# Patient Record
Sex: MALE | Race: WHITE | NOT HISPANIC OR LATINO | Employment: OTHER | URBAN - METROPOLITAN AREA
[De-identification: names, ages, dates, MRNs, and addresses within clinical notes are randomized per-mention and may not be internally consistent; named-entity substitution may affect disease eponyms.]

---

## 2017-12-19 ENCOUNTER — APPOINTMENT (EMERGENCY)
Dept: RADIOLOGY | Facility: HOSPITAL | Age: 82
End: 2017-12-19
Payer: MEDICARE

## 2017-12-19 ENCOUNTER — HOSPITAL ENCOUNTER (EMERGENCY)
Facility: HOSPITAL | Age: 82
Discharge: HOME | End: 2017-12-20
Attending: EMERGENCY MEDICINE | Admitting: EMERGENCY MEDICINE
Payer: MEDICARE

## 2017-12-19 VITALS
WEIGHT: 210.98 LBS | TEMPERATURE: 98.3 F | DIASTOLIC BLOOD PRESSURE: 86 MMHG | HEART RATE: 79 BPM | RESPIRATION RATE: 20 BRPM | OXYGEN SATURATION: 97 % | SYSTOLIC BLOOD PRESSURE: 183 MMHG

## 2017-12-19 DIAGNOSIS — S93.402A LEFT ANKLE SPRAIN: Primary | ICD-10-CM

## 2017-12-19 PROCEDURE — 73610 X-RAY EXAM OF ANKLE: CPT

## 2017-12-19 RX ORDER — IRBESARTAN 150 MG/1
75 TABLET ORAL DAILY
COMMUNITY

## 2017-12-19 RX ORDER — LANOLIN ALCOHOL/MO/W.PET/CERES
1 CREAM (GRAM) TOPICAL DAILY
COMMUNITY

## 2017-12-19 RX ORDER — LANOLIN ALCOHOL/MO/W.PET/CERES
CREAM (GRAM) TOPICAL DAILY
COMMUNITY

## 2017-12-19 RX ORDER — PREDNISOLONE ACETATE 10 MG/ML
1 SUSPENSION/ DROPS OPHTHALMIC 3 TIMES DAILY
COMMUNITY

## 2017-12-19 RX ORDER — MELATONIN
1000 DAILY
COMMUNITY

## 2017-12-19 RX ORDER — OFLOXACIN 3 MG/ML
1 SOLUTION/ DROPS OPHTHALMIC DAILY
COMMUNITY
End: 2020-10-20

## 2017-12-19 RX ORDER — SIMVASTATIN 80 MG
40 TABLET ORAL
COMMUNITY
End: 2020-06-17 | Stop reason: SDUPTHER

## 2017-12-19 RX ORDER — KETOROLAC TROMETHAMINE 4 MG/ML
1 SOLUTION/ DROPS OPHTHALMIC DAILY
COMMUNITY
End: 2022-07-25

## 2017-12-19 RX ORDER — CHLORAL HYDRATE 500 MG
1200 CAPSULE ORAL DAILY
COMMUNITY

## 2017-12-19 RX ORDER — LATANOPROST 50 UG/ML
1 SOLUTION/ DROPS OPHTHALMIC
COMMUNITY

## 2017-12-19 RX ORDER — BRIMONIDINE TARTRATE 0.15 %
1 DROPS OPHTHALMIC (EYE) 3 TIMES DAILY
COMMUNITY

## 2017-12-19 RX ORDER — THIAMINE MONONITRATE (VIT B1) 100 MG
100 TABLET ORAL DAILY
COMMUNITY

## 2017-12-19 RX ORDER — ASPIRIN 81 MG/1
81 TABLET ORAL DAILY
COMMUNITY

## 2017-12-19 RX ORDER — ATENOLOL 25 MG/1
25 TABLET ORAL DAILY
COMMUNITY

## 2017-12-19 RX ORDER — ALBUTEROL SULFATE 90 UG/1
2 AEROSOL, METERED RESPIRATORY (INHALATION) EVERY 4 HOURS PRN
COMMUNITY

## 2017-12-20 PROCEDURE — 99283 EMERGENCY DEPT VISIT LOW MDM: CPT

## 2017-12-20 NOTE — DISCHARGE INSTRUCTIONS

## 2017-12-20 NOTE — ED PROVIDER NOTES
History  Chief Complaint   Patient presents with    Ankle Injury     Patient injured left ankle while trying to get up off of the floor from fixing the sink  Injury occured yesterday  History provided by:  Patient   used: No     80-year-old male presents with moderate left lateral ankle pain after twisting it yesterday while working under his kitchen sink  Denies falling or any other injuries  States that he has been able to walk on it all day today but it is painful  No pain in the knee, medially on the ankle  Otherwise healthy  No prior injuries  On exam here he has some mild edema with tenderness laterally over the lateral malleolus  Doubt fracture but will x-ray  He declined analgesics  Prior to Admission Medications   Prescriptions Last Dose Informant Patient Reported? Taking?    Influenza Vac Split High-Dose (FLUZONE HIGH-DOSE) 0 5 ML ORLY   Yes Yes   Sig: Inject 0 5 mL into the shoulder, thigh, or buttocks once   Omega-3 Fatty Acids (FISH OIL) 1,000 mg   Yes Yes   Sig: Take 1,200 mg by mouth daily   albuterol (PROVENTIL HFA,VENTOLIN HFA) 90 mcg/act inhaler   Yes Yes   Sig: Inhale 2 puffs every 4 (four) hours as needed for wheezing   aspirin (ECOTRIN LOW STRENGTH) 81 mg EC tablet   Yes Yes   Sig: Take 81 mg by mouth daily   atenolol (TENORMIN) 25 mg tablet   Yes Yes   Sig: Take 25 mg by mouth daily   brimonidine (ALPHAGAN P) 0 15 % ophthalmic solution   Yes Yes   Si drop 3 (three) times a day   calcium citrate-vitamin D (CITRACAL+D) 315-200 MG-UNIT per tablet   Yes Yes   Sig: Take 1 tablet by mouth daily   cholecalciferol (VITAMIN D3) 1,000 units tablet   Yes Yes   Sig: Take 1,000 Units by mouth daily   cyanocobalamin (VITAMIN B-12) 1,000 mcg tablet   Yes Yes   Sig: Take by mouth daily   irbesartan (AVAPRO) 150 mg tablet   Yes Yes   Sig: Take 150 mg by mouth daily at bedtime   ketorolac (ACULAR) 0 4 % SOLN   Yes Yes   Si drop daily     latanoprost (XALATAN) 0 005 % ophthalmic solution   Yes Yes   Si drop daily at bedtime   ofloxacin (OCUFLOX) 0 3 % ophthalmic solution   Yes Yes   Si drop daily   prednisoLONE acetate (PRED FORTE) 1 % ophthalmic suspension   Yes Yes   Si drop daily   simvastatin (ZOCOR) 80 mg tablet   Yes Yes   Sig: Take 40 mg by mouth daily at bedtime   thiamine (VITAMIN B1) 100 mg tablet   Yes Yes   Sig: Take 100 mg by mouth daily      Facility-Administered Medications: None       Past Medical History:   Diagnosis Date    Cancer ([facility] [address] )     skin    Hyperlipidemia     Hypertension        History reviewed  No pertinent surgical history  History reviewed  No pertinent family history  I have reviewed and agree with the history as documented  Social History   Substance Use Topics    Smoking status: Never Smoker    Smokeless tobacco: Not on file    Alcohol use No        Review of Systems   Constitutional: Negative for activity change and appetite change  Musculoskeletal: Positive for gait problem  Negative for neck pain  Skin: Negative for color change and wound  Neurological: Negative for weakness  All other systems reviewed and are negative  Physical Exam  ED Triage Vitals   Temperature Pulse Respirations Blood Pressure SpO2   17 2307 17 2305 17 2305 17 2305 17 230   98 3 °F (36 8 °C) 79 20 (!) 183/86 97 %      Temp Source Heart Rate Source Patient Position - Orthostatic VS BP Location FiO2 (%)   17 230 -- 17 2305 17 --   Oral  Lying Right arm       Pain Score       --                  Orthostatic Vital Signs  Vitals:    17   BP: (!) 183/86   Pulse: 79   Patient Position - Orthostatic VS: Lying       Physical Exam   Constitutional: He is oriented to person, place, and time  He appears well-developed and well-nourished  No distress  HENT:   Head: Normocephalic and atraumatic  Cardiovascular: Normal rate, regular rhythm and intact distal pulses  Pulmonary/Chest: Effort normal  No respiratory distress  Musculoskeletal: He exhibits no deformity  Tenderness of the left lateral ankle both the malleolus and soft tissues  Neurological: He is alert and oriented to person, place, and time  No sensory deficit  He exhibits normal muscle tone  Skin: Skin is warm and dry  Psychiatric: He has a normal mood and affect  His behavior is normal    Nursing note and vitals reviewed  ED Medications  Medications - No data to display    Diagnostic Studies  Results Reviewed     None                 XR ankle 3+ views LEFT   ED Interpretation by Jarred Rabago MD (12/19 2545)   No fracture                 Procedures  Procedures       Phone Contacts  ED Phone Contact    ED Course  ED Course                                MDM  Number of Diagnoses or Management Options  Left ankle sprain:   Diagnosis management comments: 68-year-old male presented with left lateral ankle pain after twisting it while working on the floor yesterday  Tenderness and mild edema on exam   X-ray normal   Will discharge home  He declined analgesics  Amount and/or Complexity of Data Reviewed  Tests in the radiology section of CPT®: ordered and reviewed    Patient Progress  Patient progress: stable    CritCare Time    Disposition  Final diagnoses:   Left ankle sprain     Time reflects when diagnosis was documented in both MDM as applicable and the Disposition within this note     Time User Action Codes Description Comment    12/19/2017 11:46 PM Lacretia Ped Add [[id number] [id number]] Left ankle sprain       ED Disposition     ED Disposition Condition Comment    Discharge  [address] discharge to home/self care      Condition at discharge: Good        Follow-up Information     Follow up With Specialties Details Why Contact Info Additional Information    Emma Toro, Lilian[address] Raúl  Medicine   [facility] [address] [place] [address] Emergency Department Emergency Medicine  If symptoms worsen [address] [facility] [address] [phone] [facility] ED, [address], [place], [place], [address]        Patient's Medications   Discharge Prescriptions    No medications on file     No discharge procedures on file      ED Provider  Electronically Signed by           Yvan Moreno MD  12/19/17 5991

## 2019-05-02 ENCOUNTER — LAB REQUISITION (OUTPATIENT)
Dept: LAB | Facility: HOSPITAL | Age: 84
End: 2019-05-02
Payer: MEDICARE

## 2019-05-02 DIAGNOSIS — R31.21 ASYMPTOMATIC MICROSCOPIC HEMATURIA: ICD-10-CM

## 2019-05-03 PROCEDURE — 88112 CYTOPATH CELL ENHANCE TECH: CPT | Performed by: PATHOLOGY

## 2020-06-17 ENCOUNTER — OFFICE VISIT (OUTPATIENT)
Dept: FAMILY MEDICINE CLINIC | Facility: CLINIC | Age: 85
End: 2020-06-17
Payer: MEDICARE

## 2020-06-17 VITALS
HEART RATE: 59 BPM | OXYGEN SATURATION: 98 % | DIASTOLIC BLOOD PRESSURE: 64 MMHG | RESPIRATION RATE: 16 BRPM | HEIGHT: 78 IN | SYSTOLIC BLOOD PRESSURE: 110 MMHG | TEMPERATURE: 97.3 F | WEIGHT: 201 LBS | BODY MASS INDEX: 23.26 KG/M2

## 2020-06-17 DIAGNOSIS — E78.2 MIXED HYPERLIPIDEMIA: ICD-10-CM

## 2020-06-17 DIAGNOSIS — F02.80 LATE ONSET ALZHEIMER'S DISEASE WITHOUT BEHAVIORAL DISTURBANCE (HCC): ICD-10-CM

## 2020-06-17 DIAGNOSIS — G30.1 LATE ONSET ALZHEIMER'S DISEASE WITHOUT BEHAVIORAL DISTURBANCE (HCC): ICD-10-CM

## 2020-06-17 DIAGNOSIS — G61.81 CHRONIC INFLAMMATORY DEMYELINATING POLYNEUROPATHY (HCC): ICD-10-CM

## 2020-06-17 DIAGNOSIS — I10 ESSENTIAL HYPERTENSION: Primary | ICD-10-CM

## 2020-06-17 PROBLEM — J41.0 SIMPLE CHRONIC BRONCHITIS (HCC): Status: ACTIVE | Noted: 2020-06-17

## 2020-06-17 PROCEDURE — 99214 OFFICE O/P EST MOD 30 MIN: CPT | Performed by: FAMILY MEDICINE

## 2020-06-17 PROCEDURE — 3008F BODY MASS INDEX DOCD: CPT | Performed by: FAMILY MEDICINE

## 2020-06-17 PROCEDURE — 1160F RVW MEDS BY RX/DR IN RCRD: CPT | Performed by: FAMILY MEDICINE

## 2020-06-17 PROCEDURE — 1036F TOBACCO NON-USER: CPT | Performed by: FAMILY MEDICINE

## 2020-06-17 PROCEDURE — 3078F DIAST BP <80 MM HG: CPT | Performed by: FAMILY MEDICINE

## 2020-06-17 PROCEDURE — 3074F SYST BP LT 130 MM HG: CPT | Performed by: FAMILY MEDICINE

## 2020-06-17 RX ORDER — FOLIC ACID 0.8 MG
TABLET ORAL
COMMUNITY

## 2020-06-17 RX ORDER — SIMVASTATIN 40 MG
TABLET ORAL
COMMUNITY

## 2020-06-17 RX ORDER — IRBESARTAN 300 MG/1
TABLET ORAL
COMMUNITY
End: 2020-06-17 | Stop reason: SDUPTHER

## 2020-06-17 RX ORDER — FUROSEMIDE 20 MG/1
TABLET ORAL
COMMUNITY
Start: 2020-05-23 | End: 2020-08-17

## 2020-06-17 RX ORDER — LANOLIN ALCOHOL/MO/W.PET/CERES
CREAM (GRAM) TOPICAL
COMMUNITY
End: 2020-06-17 | Stop reason: SDUPTHER

## 2020-06-17 RX ORDER — NITROGLYCERIN 0.4 MG/1
0.4 TABLET SUBLINGUAL
COMMUNITY

## 2020-08-17 DIAGNOSIS — I10 ESSENTIAL HYPERTENSION: Primary | ICD-10-CM

## 2020-08-17 RX ORDER — FUROSEMIDE 20 MG/1
TABLET ORAL
Qty: 30 TABLET | Refills: 6 | Status: SHIPPED | OUTPATIENT
Start: 2020-08-17 | End: 2021-01-27

## 2020-10-20 ENCOUNTER — OFFICE VISIT (OUTPATIENT)
Dept: FAMILY MEDICINE CLINIC | Facility: CLINIC | Age: 85
End: 2020-10-20
Payer: MEDICARE

## 2020-10-20 VITALS
DIASTOLIC BLOOD PRESSURE: 78 MMHG | HEART RATE: 58 BPM | OXYGEN SATURATION: 96 % | SYSTOLIC BLOOD PRESSURE: 132 MMHG | HEIGHT: 78 IN | TEMPERATURE: 96.8 F | BODY MASS INDEX: 22.21 KG/M2 | WEIGHT: 192 LBS | RESPIRATION RATE: 18 BRPM

## 2020-10-20 DIAGNOSIS — J43.9 PULMONARY EMPHYSEMA, UNSPECIFIED EMPHYSEMA TYPE (HCC): ICD-10-CM

## 2020-10-20 DIAGNOSIS — R26.2 AMBULATORY DYSFUNCTION: ICD-10-CM

## 2020-10-20 DIAGNOSIS — R13.12 OROPHARYNGEAL DYSPHAGIA: Primary | ICD-10-CM

## 2020-10-20 PROCEDURE — 99214 OFFICE O/P EST MOD 30 MIN: CPT | Performed by: FAMILY MEDICINE

## 2020-11-03 ENCOUNTER — HOSPITAL ENCOUNTER (OUTPATIENT)
Dept: RADIOLOGY | Facility: HOSPITAL | Age: 85
Discharge: HOME | End: 2020-11-03
Attending: FAMILY MEDICINE
Payer: MEDICARE

## 2020-11-03 ENCOUNTER — TELEPHONE (OUTPATIENT)
Dept: FAMILY MEDICINE CLINIC | Facility: CLINIC | Age: 85
End: 2020-11-03

## 2020-11-03 DIAGNOSIS — R13.12 OROPHARYNGEAL DYSPHAGIA: ICD-10-CM

## 2020-11-03 DIAGNOSIS — R26.2 AMBULATORY DYSFUNCTION: ICD-10-CM

## 2020-11-03 PROCEDURE — 72040 X-RAY EXAM NECK SPINE 2-3 VW: CPT

## 2020-11-03 PROCEDURE — 92611 MOTION FLUOROSCOPY/SWALLOW: CPT

## 2020-11-03 PROCEDURE — 74230 X-RAY XM SWLNG FUNCJ C+: CPT

## 2020-11-04 DIAGNOSIS — R13.10 DYSPHAGIA, UNSPECIFIED TYPE: Primary | ICD-10-CM

## 2020-11-04 DIAGNOSIS — R13.12 OROPHARYNGEAL DYSPHAGIA: ICD-10-CM

## 2020-11-05 ENCOUNTER — EVALUATION (OUTPATIENT)
Dept: SPEECH THERAPY | Age: 85
End: 2020-11-05
Payer: MEDICARE

## 2020-11-05 DIAGNOSIS — R13.12 OROPHARYNGEAL DYSPHAGIA: Primary | ICD-10-CM

## 2020-11-05 PROCEDURE — 92610 EVALUATE SWALLOWING FUNCTION: CPT

## 2020-11-05 PROCEDURE — 92526 ORAL FUNCTION THERAPY: CPT

## 2020-11-10 ENCOUNTER — OFFICE VISIT (OUTPATIENT)
Dept: SPEECH THERAPY | Age: 85
End: 2020-11-10
Payer: MEDICARE

## 2020-11-10 DIAGNOSIS — R13.12 OROPHARYNGEAL DYSPHAGIA: Primary | ICD-10-CM

## 2020-11-10 PROCEDURE — 92526 ORAL FUNCTION THERAPY: CPT

## 2020-11-16 ENCOUNTER — OFFICE VISIT (OUTPATIENT)
Dept: SPEECH THERAPY | Age: 85
End: 2020-11-16
Payer: MEDICARE

## 2020-11-16 DIAGNOSIS — R13.12 OROPHARYNGEAL DYSPHAGIA: Primary | ICD-10-CM

## 2020-11-16 PROCEDURE — 92526 ORAL FUNCTION THERAPY: CPT

## 2021-01-13 ENCOUNTER — LAB (OUTPATIENT)
Dept: LAB | Facility: AMBULARY SURGERY CENTER | Age: 86
End: 2021-01-13
Payer: MEDICARE

## 2021-01-13 ENCOUNTER — TRANSCRIBE ORDERS (OUTPATIENT)
Dept: LAB | Facility: AMBULARY SURGERY CENTER | Age: 86
End: 2021-01-13

## 2021-01-13 DIAGNOSIS — E78.00 PURE HYPERCHOLESTEROLEMIA: ICD-10-CM

## 2021-01-13 DIAGNOSIS — I25.10 DISEASE OF CARDIOVASCULAR SYSTEM: ICD-10-CM

## 2021-01-13 DIAGNOSIS — Z79.899 ENCOUNTER FOR LONG-TERM (CURRENT) USE OF OTHER MEDICATIONS: ICD-10-CM

## 2021-01-13 DIAGNOSIS — E78.00 PURE HYPERCHOLESTEROLEMIA: Primary | ICD-10-CM

## 2021-01-13 LAB
ANION GAP SERPL CALCULATED.3IONS-SCNC: 0 MMOL/L (ref 4–13)
BUN SERPL-MCNC: 31 MG/DL (ref 5–25)
CALCIUM SERPL-MCNC: 9.6 MG/DL (ref 8.3–10.1)
CHLORIDE SERPL-SCNC: 111 MMOL/L (ref 100–108)
CO2 SERPL-SCNC: 31 MMOL/L (ref 21–32)
CREAT SERPL-MCNC: 1.3 MG/DL (ref 0.6–1.3)
GFR SERPL CREATININE-BSD FRML MDRD: 49 ML/MIN/1.73SQ M
GLUCOSE SERPL-MCNC: 94 MG/DL (ref 65–140)
POTASSIUM SERPL-SCNC: 4.2 MMOL/L (ref 3.5–5.3)
SODIUM SERPL-SCNC: 142 MMOL/L (ref 136–145)

## 2021-01-13 PROCEDURE — 80048 BASIC METABOLIC PNL TOTAL CA: CPT

## 2021-01-13 PROCEDURE — 36415 COLL VENOUS BLD VENIPUNCTURE: CPT

## 2021-01-27 DIAGNOSIS — I10 ESSENTIAL HYPERTENSION: ICD-10-CM

## 2021-01-27 RX ORDER — FUROSEMIDE 20 MG/1
TABLET ORAL
Qty: 30 TABLET | Refills: 6 | Status: SHIPPED | OUTPATIENT
Start: 2021-01-27 | End: 2021-10-19

## 2021-02-05 ENCOUNTER — TELEPHONE (OUTPATIENT)
Dept: SPEECH THERAPY | Age: 86
End: 2021-02-05

## 2021-02-05 NOTE — TELEPHONE ENCOUNTER
Spoke with wife  She reports he is still thickening his liquids  Not coughing as much  Close episode at this time  Wife to call if able to return to therapy with ability to drive  At this time not comfortable

## 2021-02-17 ENCOUNTER — OFFICE VISIT (OUTPATIENT)
Dept: FAMILY MEDICINE CLINIC | Facility: CLINIC | Age: 86
End: 2021-02-17
Payer: MEDICARE

## 2021-02-17 VITALS
DIASTOLIC BLOOD PRESSURE: 64 MMHG | HEART RATE: 74 BPM | OXYGEN SATURATION: 95 % | SYSTOLIC BLOOD PRESSURE: 114 MMHG | TEMPERATURE: 96 F | BODY MASS INDEX: 22.31 KG/M2 | HEIGHT: 78 IN | WEIGHT: 192.8 LBS | RESPIRATION RATE: 18 BRPM

## 2021-02-17 DIAGNOSIS — E55.9 VITAMIN D INSUFFICIENCY: ICD-10-CM

## 2021-02-17 DIAGNOSIS — F02.80 LATE ONSET ALZHEIMER'S DISEASE WITHOUT BEHAVIORAL DISTURBANCE (HCC): ICD-10-CM

## 2021-02-17 DIAGNOSIS — E78.2 MIXED HYPERLIPIDEMIA: ICD-10-CM

## 2021-02-17 DIAGNOSIS — I10 ESSENTIAL HYPERTENSION: ICD-10-CM

## 2021-02-17 DIAGNOSIS — G61.81 CHRONIC INFLAMMATORY DEMYELINATING POLYNEUROPATHY (HCC): ICD-10-CM

## 2021-02-17 DIAGNOSIS — J43.9 PULMONARY EMPHYSEMA, UNSPECIFIED EMPHYSEMA TYPE (HCC): ICD-10-CM

## 2021-02-17 DIAGNOSIS — R13.12 OROPHARYNGEAL DYSPHAGIA: ICD-10-CM

## 2021-02-17 DIAGNOSIS — I73.9 PAD (PERIPHERAL ARTERY DISEASE) (HCC): ICD-10-CM

## 2021-02-17 DIAGNOSIS — G30.1 LATE ONSET ALZHEIMER'S DISEASE WITHOUT BEHAVIORAL DISTURBANCE (HCC): ICD-10-CM

## 2021-02-17 DIAGNOSIS — Z12.11 SCREEN FOR COLON CANCER: ICD-10-CM

## 2021-02-17 DIAGNOSIS — R63.8 INCREASED BMI: Primary | ICD-10-CM

## 2021-02-17 DIAGNOSIS — R26.2 AMBULATORY DYSFUNCTION: ICD-10-CM

## 2021-02-17 PROCEDURE — 99214 OFFICE O/P EST MOD 30 MIN: CPT | Performed by: FAMILY MEDICINE

## 2021-02-17 PROCEDURE — 1123F ACP DISCUSS/DSCN MKR DOCD: CPT | Performed by: FAMILY MEDICINE

## 2021-02-17 PROCEDURE — G0439 PPPS, SUBSEQ VISIT: HCPCS | Performed by: FAMILY MEDICINE

## 2021-02-17 RX ORDER — NIACINAMIDE, ADENOSINE 1; .02 G/50ML; G/50ML
CREAM TOPICAL
COMMUNITY
End: 2022-07-25

## 2021-02-17 NOTE — PROGRESS NOTES
Assessment and Plan:     Problem List Items Addressed This Visit        Cardiovascular and Mediastinum    Essential hypertension       Nervous and Auditory    Late onset Alzheimer'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 ([facility] [address] )       Other    Mixed hyperlipidemia      Other Visit Diagnoses     Increased BMI    -  Primary    BMI 22    Oropharyngeal dysphagia        Cont on Thick-It    Pulmonary emphysema, unspecified emphysema type ([facility] [address] )        Stopped smoking at age 49 yo - "just gave it up"    Ambulatory dysfunction        Vitamin D insufficiency               Preventive health issues were discussed with patient, and age appropriate screening tests were ordered as noted in patient's After Visit Summary  Personalized health advice and appropriate referrals for health education or preventive services given if needed, as noted in patient's After Visit Summary       History of Present Illness:     Patient presents for Medicare Annual Wellness visit    Patient Care Team:  Kashmir Marlow DO as PCP - General (Family Medicine)     Problem List:     Patient Active Problem List   Diagnosis    Essential hypertension    Mixed hyperlipidemia    Simple chronic bronchitis (HCC)    Chronic inflammatory demyelinating polyneuropathy ([facility] [address] )    [facility] onset 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      Past Medical and Surgical History:     Past Medical History:   Diagnosis Date    [facility] )     skin; basal cell carcinoma     Heart disease     Hyperlipidemia     Hypertension      Past Surgical History:   Procedure Laterality Date    BASAL CELL CARCINOMA EXCISION  10/11/2019    Removed from back and face    CATARACT EXTRACTION, BILATERAL Bilateral 01/01/2014    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    CORONARY ANGIOPLASTY WITH STENT PLACEMENT  01/01/2007    SKIN CANCER EXCISION Left 04/28/2017    skin cancer removed from left side of face      Family History:     Family History   Problem Relation Age of Onset  No Known Problems Mother     No Known Problems Father       Social History:     E-Cigarette/Vaping    E-Cigarette Use Never User      E-Cigarette/Vaping Substances    Nicotine No     THC No     CBD No     Flavoring No     Other No     Unknown No      Social History     Socioeconomic History    Marital status: /Civil Union     Spouse name: Mamadou Ahuja Number of children: None    Years of education: None    Highest education level: None   Occupational History    None   Social Needs    Financial resource strain: None    Food insecurity     Worry: None     Inability: None    Transportation needs     Medical: None     Non-medical: None   Tobacco Use    Smoking status: Former Smoker     Packs/day: 1 00     Years: 32 00     Pack years: 32 00     Types: Cigarettes    Smokeless tobacco: Never Used    Tobacco comment: smoked age [age], about 1 ppd    Substance and Sexual Activity    Alcohol use: Yes     Frequency: Monthly or less    Drug use: No    Sexual activity: None   Lifestyle    Physical activity     Days per week: None     Minutes per session: None    Stress: None   Relationships    Social connections     Talks on phone: None     Gets together: None     Attends Baptism service: None     Active member of club or organization: None     Attends meetings of clubs or organizations: None     Relationship status: None    Intimate partner violence     Fear of current or ex partner: None     Emotionally abused: None     Physically abused: None     Forced sexual activity: None   Other Topics Concern    None   Social History Narrative    · Most recent tobacco use screenin-      · Do you currently or have you served in the [employer]:    Yes      · If Yes, What branch of service:   [employer]      · Were you activated, into active duty, as a member of the [employer] or as a Reservist:Yes, 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     · Diet:   Regular      · General stress level:   Low · Guns present in home:   No      · Seat belts used routinely:   Yes      · Smoke alarm in home: Yes      · Advance directive: Yes      · Live alone or with others:   with others      · Are there stairs in your home: Yes      · Pets:   No       Medications and Allergies:     Current Outpatient Medications   Medication Sig Dispense Refill    albuterol (PROVENTIL HFA,VENTOLIN HFA) 90 mcg/act inhaler Inhale 2 puffs every 4 (four) hours as needed for wheezing      aspirin (ECOTRIN LOW STRENGTH) 81 mg EC tablet Take 81 mg by mouth daily      atenolol (TENORMIN) 25 mg tablet Take 25 mg by mouth daily      brimonidine (ALPHAGAN P) 0 15 % ophthalmic solution 1 drop 3 (three) times a day      calcium citrate-vitamin D (CITRACAL+D) 315-200 MG-UNIT per tablet Take 1 tablet by mouth daily      cholecalciferol (VITAMIN D3) 1,000 units tablet Take 1,000 Units by mouth daily      cyanocobalamin (VITAMIN B-12) 1,000 mcg tablet Take by mouth daily      furosemide (LASIX) 20 mg tablet TAKE 1 TABLET BY MOUTH EVERY DAY 30 tablet 6    Influenza Vac Split High-Dose (FLUZONE HIGH-DOSE) 0 5 ML ORLY Inject 0 5 mL into the shoulder, thigh, or buttocks once      irbesartan (AVAPRO) 150 mg tablet Take 150 mg by mouth daily at bedtime      ketorolac (ACULAR) 0 4 % SOLN 1 drop daily        latanoprost (XALATAN) 0 005 % ophthalmic solution 1 drop daily at bedtime      Magnesium 500 MG CAPS Take by mouth      Omega-3 Fatty Acids (FISH OIL) 1,000 mg Take 1,200 mg by mouth daily      prednisoLONE acetate (PRED FORTE) 1 % ophthalmic suspension 1 drop daily      simvastatin (ZOCOR) 40 mg tablet TAKE 20MG (ONE-HALF TABLET) BY MOUTH QD5P FOR CHOLESTEROL      STARCH-MALTO DEXTRIN (Thick-It) POWD Take by mouth      thiamine (VITAMIN B1) 100 mg tablet Take 100 mg by mouth daily      nitroglycerin (NITROSTAT) 0 4 mg SL tablet Place 0 4 mg under the tongue       No current facility-administered medications for this visit  Allergies   Allergen Reactions    Gadolinium Itching    Contrast  [Iodinated Diagnostic Agents] Rash    Iodine Rash      Immunizations:     Immunization History   Administered Date(s) Administered    INFLUENZA 10/01/2019, 09/01/2020    Influenza Split High Dose Preservative Free IM 09/13/2018    Pneumococcal 11/05/2002    Pneumococcal Conjugate 13-Valent 08/25/2017    SARS-CoV-2 / COVID-19 mRNA IM (Moderna) 02/08/2021    Tdap 08/25/2017    influenza, trivalent, adjuvanted 10/24/2019      Health Maintenance: There are no preventive care reminders to display for this patient  Topic Date Due    Pneumococcal Vaccine: 65+ Years (2 of 2 - PPSV23) 08/25/2018    Influenza Vaccine (1) 09/01/2020      Medicare Health Risk Assessment:     /64   Pulse 74   Temp (!) 96 °F (35 6 °C)   Resp 18   Ht 6' 6" (1 981 m)   Wt 87 5 kg (192 lb 12 8 oz)   SpO2 95%   BMI 22 28 kg/m²          Health Risk Assessment:   Patient rates overall health as very good  Patient feels that their physical health rating is same  Eyesight was rated as same  Hearing was rated as same  Patient feels that their emotional and mental health rating is same  Pain experienced in the last 7 days has been none  Patient states that he has experienced no weight loss or gain in last 6 months  Depression Screening:   PHQ-2 Score: 0      Fall Risk Screening: In the past year, patient has experienced: no history of falling in past year      Home Safety:  Patient does not have trouble with stairs inside or outside of their home  Patient has working smoke alarms and has working carbon monoxide detector  Home safety hazards include: none  Nutrition:   Current diet is Regular and Low Cholesterol  On Zocor 20    Medications:   Patient is currently taking over-the-counter supplements  OTC medications include: see medication list  Patient is able to manage medications       Activities of Daily Living (ADLs)/Instrumental Activities of Daily Living (IADLs):   Walk and transfer into and out of bed and chair?: Yes  Dress and groom yourself?: Yes    Bathe or shower yourself?: Yes    Feed yourself? Yes  Do your laundry/housekeeping?: Yes  Manage your money, pay your bills and track your expenses?: Yes  Make your own meals?: Yes    Do your own shopping?: Yes    Previous Hospitalizations:   Any hospitalizations or ED visits within the last 12 months?: No      Advance Care Planning:   Living will: Yes    Durable POA for healthcare: No    Advanced directive: Yes      PREVENTIVE SCREENINGS      Cardiovascular Screening:    General: Screening Not Indicated, History Lipid Disorder and Screening Current    Due for: Lipid Panel      Diabetes Screening:     General: Screening Current      Colorectal Cancer Screening:     General: Screening Not Indicated    Due for: FOBT/FIT      Prostate Cancer Screening:    General: Screening Not Indicated and Screening Current      Osteoporosis Screening:    General: Patient Declines      Abdominal Aortic Aneurysm (AAA) Screening:    Risk factors include: tobacco use        Lung Cancer Screening:     General: Screening Not Indicated      Hepatitis C Screening:      Hep C Screening Accepted: No       Preventive Screening Comments: Cardiologist Dr Rubens Baker did ECHO, carotid art test, and chemical nuclear stress test in Jan 2021==>all reported good  Last colonoscopy several yrs ago -- will ck immuno assay now-- Dr Deepa Gaytan did in past    Other Counseling Topics:   Alcohol use counseling, car/seat belt/driving safety, skin self-exam, sunscreen and calcium and vitamin D intake and regular weightbearing exercise         Cindy Contreras, DO

## 2021-02-17 NOTE — PROGRESS NOTES
BMI Counseling: Body mass index is 22 28 kg/m²  The BMI is above normal  Nutrition recommendations include decreasing portion sizes, encouraging healthy choices of fruits and vegetables, decreasing fast food intake, consuming healthier snacks, limiting drinks that contain sugar, moderation in carbohydrate intake, increasing intake of lean protein, reducing intake of saturated and trans fat and reducing intake of cholesterol  Exercise recommendations include moderate physical activity 150 minutes/week and exercising 3-5 times per week  No pharmacotherapy was ordered  BMI Counseling: Body mass index is 22 28 kg/m²  Follow-up plan was not completed due to patient refusing BMI follow-up plan  Body mass index is 22 28 kg/m²  Follow-up plan was not completed due to patient being in urgent or emergent medical situation  Body mass index is 22 28 kg/m²  Follow-up plan was not completed due to elderly patient (72 years old) where weight reduction/weight gain would complicate underlying health condition such as: illness or physical disability and mental illness, dementia, or confusion  Assessment/Plan:87 y o male, mildly confused, and wife leads him bout the house, like finding the wax paper where it's been from 30 yrs, still can't find it, likes to wash the dishs,   well-known to me for many years presents for f/u and evaluation of the following medical issues:  As per [facility]:     · HTN - controlled on  Atenolol 25  · HLD - on Zocor 40 mg 1/2 tab and doing ok   · CHF - Lasix 20 mg MWF and helps   Not SOB, unless on steps  · Early glaucoma - sees Dr Tash Ram, now retired           Problem List Items Addressed This Visit        Cardiovascular and Mediastinum    Essential hypertension       Nervous and Auditory    Late onset Alzheimer'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 ([place])       Other    Mixed hyperlipidemia      Other Visit Diagnoses     Increased BMI    -  Primary    BMI 22    Oropharyngeal dysphagia Cont on Thick-It    Pulmonary emphysema, unspecified emphysema type ([facility] [address] )        Stopped smoking at age 47 yo - "just gave it up"    Ambulatory dysfunction        Vitamin D insufficiency                Subjective:      Patient ID: Nikhil Duttno is a 80 y o  male  HPI HTN - controlled on  Atenolol 25  HLD - on Zocor 40 mg 1/2 tab and doing ok   CHF - Lasix 20 mg MWF and helps  Not SOB, unless on steps  Early glaucoma - sees Dr Eliot Carrillo, now retired    The following portions of the patient's history were reviewed and updated as appropriate:   Past Medical History:  He has a past medical history of Cancer ([facility] [address] ), Heart disease, Hyperlipidemia, and Hypertension  ,  _______________________________________________________________________  Medical Problems:  does not have any pertinent problems on file ,  _______________________________________________________________________  Past Surgical History:   has a past surgical history that includes Excision basal cell carcinoma (10/11/2019); Coronary angioplasty with stent (01/01/2007); Cataract extraction, bilateral (Bilateral, 01/01/2014); Corneal transplant (Left, 01/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); and Skin cancer excision (Left, 04/28/2017)  ,  _______________________________________________________________________  Family History:  family history includes No Known Problems in his father and mother ,  _______________________________________________________________________  Social History:   reports that he has quit smoking  His smoking use included cigarettes  He has a 32 00 pack-year smoking history  He has never used smokeless tobacco  He reports current alcohol use  He reports that he does not use drugs  ,  _______________________________________________________________________  Allergies:  is allergic to gadolinium; contrast  [iodinated diagnostic agents]; and iodine     _______________________________________________________________________  Current Outpatient Medications   Medication Sig Dispense Refill    albuterol (PROVENTIL HFA,VENTOLIN HFA) 90 mcg/act inhaler Inhale 2 puffs every 4 (four) hours as needed for wheezing      aspirin (ECOTRIN LOW STRENGTH) 81 mg EC tablet Take 81 mg by mouth daily      atenolol (TENORMIN) 25 mg tablet Take 25 mg by mouth daily      brimonidine (ALPHAGAN P) 0 15 % ophthalmic solution 1 drop 3 (three) times a day      calcium citrate-vitamin D (CITRACAL+D) 315-200 MG-UNIT per tablet Take 1 tablet by mouth daily      cholecalciferol (VITAMIN D3) 1,000 units tablet Take 1,000 Units by mouth daily      cyanocobalamin (VITAMIN B-12) 1,000 mcg tablet Take by mouth daily      furosemide (LASIX) 20 mg tablet TAKE 1 TABLET BY MOUTH EVERY DAY 30 tablet 6    Influenza Vac Split High-Dose (FLUZONE HIGH-DOSE) 0 5 ML ORLY Inject 0 5 mL into the shoulder, thigh, or buttocks once      irbesartan (AVAPRO) 150 mg tablet Take 150 mg by mouth daily at bedtime      ketorolac (ACULAR) 0 4 % SOLN 1 drop daily        latanoprost (XALATAN) 0 005 % ophthalmic solution 1 drop daily at bedtime      Magnesium 500 MG CAPS Take by mouth      Omega-3 Fatty Acids (FISH OIL) 1,000 mg Take 1,200 mg by mouth daily      prednisoLONE acetate (PRED FORTE) 1 % ophthalmic suspension 1 drop daily      simvastatin (ZOCOR) 40 mg tablet TAKE 20MG (ONE-HALF TABLET) BY MOUTH QD5P FOR CHOLESTEROL      STARCH-MALTO DEXTRIN (Thick-It) POWD Take by mouth      thiamine (VITAMIN B1) 100 mg tablet Take 100 mg by mouth daily      nitroglycerin (NITROSTAT) 0 4 mg SL tablet Place 0 4 mg under the tongue       No current facility-administered medications for this visit       _______________________________________________________________________  Review of Systems   Constitutional: Negative for activity change, appetite change, chills, diaphoresis, fatigue, fever and unexpected weight change     HENT: Negative for congestion, dental problem, drooling, ear discharge, ear pain, facial swelling, mouth sores, nosebleeds, postnasal drip, rhinorrhea, trouble swallowing and voice change  Eyes: Negative for photophobia, pain, discharge, redness, itching and visual disturbance  Sees Carlos Alberto rutherford dr there, and Dr Sigrid Demarco  Pressure up in [facility] eye and they will erika in 2 wks  Respiratory: Negative for apnea, cough, choking, chest tightness and shortness of breath  [facility] - due again in [place]  Cardiovascular: Negative for chest pain and leg swelling  Denies any and all cardiac symptoms   Gastrointestinal: Negative for abdominal distention, abdominal pain, constipation, diarrhea and nausea  Endocrine: Negative for polydipsia, polyphagia and polyuria  Genitourinary: Negative for decreased urine volume, difficulty urinating, dysuria, enuresis and hematuria  Musculoskeletal: Positive for arthralgias, back pain and neck pain  Negative for gait problem and joint swelling  Could C stenosis be partly responsible for his amb dysfunction? Skin: Negative for color change, pallor, rash and wound  Allergic/Immunologic: Negative for immunocompromised state  Neurological: Negative for dizziness, seizures, syncope, facial asymmetry, speech difficulty, light-headedness and headaches  Hematological: Negative for adenopathy  Psychiatric/Behavioral: Positive for confusion  Negative for agitation, behavioral problems and decreased concentration  Objective:  Vitals:    02/17/21 0835   BP: 114/64   Pulse: 74   Resp: 18   Temp: (!) 96 °F (35 6 °C)   SpO2: 95%   Weight: 87 5 kg (192 lb 12 8 oz)   Height: 6' 6" (1 981 m)     Body mass index is 22 28 kg/m²  Physical Exam  Vitals signs and nursing note reviewed  Constitutional:       Appearance: Normal appearance  He is well-developed and normal weight  He is not diaphoretic  HENT:      Head: Normocephalic and atraumatic        Nose: Nose normal    Eyes:      Pupils: Pupils are equal, round, and reactive to light  Neck:      Musculoskeletal: Normal range of motion and neck supple  Trachea: No tracheal deviation  Cardiovascular:      Rate and Rhythm: Normal rate and regular rhythm  Heart sounds: Normal heart sounds  Pulmonary:      Effort: Pulmonary effort is normal       Breath sounds: Normal breath sounds  No wheezing  Abdominal:      General: Bowel sounds are normal       Palpations: Abdomen is soft  Musculoskeletal: Normal range of motion  Lymphadenopathy:      Cervical: No cervical adenopathy  Skin:     General: Skin is warm and dry  Neurological:      Mental Status: He is alert and oriented to person, place, and time     Psychiatric:         Mood and Affect: Mood normal          Behavior: Behavior normal       Comments: Dementia persists

## 2021-02-17 NOTE — PATIENT INSTRUCTIONS
Medicare Preventive Visit Patient Instructions  Thank you for completing your Welcome to Medicare Visit or Medicare Annual Wellness Visit today  Your next wellness visit will be due in one year (2/17/2022)  The screening/preventive services that you may require over the next 5-10 years are detailed below  Some tests may not apply to you based off risk factors and/or age  Screening tests ordered at today's visit but not completed yet may show as past due  Also, please note that scanned in results may not display below  Preventive Screenings:  Service Recommendations Previous Testing/Comments   Colorectal Cancer Screening  · Colonoscopy    · Fecal Occult Blood Test (FOBT)/Fecal Immunochemical Test (FIT)  · Fecal DNA/Cologuard Test  · Flexible Sigmoidoscopy Age: 54-65 years old   Colonoscopy: every 10 years (May be performed more frequently if at higher risk)  OR  FOBT/FIT: every 1 year  OR  Cologuard: every 3 years  OR  Sigmoidoscopy: every 5 years  Screening may be recommended earlier than age 48 if at higher risk for colorectal cancer  Also, an individualized decision between you and your healthcare provider will decide whether screening between the ages of 74-80 would be appropriate   Colonoscopy: Not on file  FOBT/FIT: Not on file  Cologuard: Not on file  Sigmoidoscopy: Not on file    Screening Not Indicated     Prostate Cancer Screening Individualized decision between patient and health care provider in men between ages of 53-78   Medicare will cover every 12 months beginning on the day after your 50th birthday PSA: No results in last 5 years     Screening Not Indicated     Hepatitis C Screening Once for adults born between [facility]  More frequently in patients at high risk for Hepatitis C Hep C Antibody: Not on file       Diabetes Screening 1-2 times per year if you're at risk for diabetes or have pre-diabetes Fasting glucose: No results in last 5 years   A1C: No results in last 5 years    Screening Current   Cholesterol Screening Once every 5 years if you don't have a lipid disorder  May order more often based on risk factors  Lipid panel: Not on file    Screening Not Indicated  History Lipid Disorder      Other Preventive Screenings Covered by Medicare:  1  Abdominal Aortic Aneurysm (AAA) Screening: covered once if your at risk  You're considered to be at risk if you have a family history of AAA or a male between the age of 73-68 who smoking at least 100 cigarettes in your lifetime  2  Lung Cancer Screening: covers low dose CT scan once per year if you meet all of the following conditions: (1) Age 50-69; (2) No signs or symptoms of lung cancer; (3) Current smoker or have quit smoking within the last 15 years; (4) You have a tobacco smoking history of at least 30 pack years (packs per day x number of years you smoked); (5) You get a written order from a healthcare provider  3  Glaucoma Screening: covered annually if you're considered high risk: (1) You have diabetes OR (2) Family history of glaucoma OR (3)  aged 48 and older OR (3)  American aged 72 and older  3  Osteoporosis Screening: covered every 2 years if you meet one of the following conditions: (1) Have a vertebral abnormality; (2) On glucocorticoid therapy for more than 3 months; (3) Have primary hyperparathyroidism; (4) On osteoporosis medications and need to assess response to drug therapy  5  HIV Screening: covered annually if you're between the age of 12-76  Also covered annually if you are younger than 13 and older than 72 with risk factors for HIV infection  For pregnant patients, it is covered up to 3 times per pregnancy      Immunizations:  Immunization Recommendations   Influenza Vaccine Annual influenza vaccination during flu season is recommended for all persons aged >= 6 months who do not have contraindications   Pneumococcal Vaccine (Prevnar and Pneumovax)  * Prevnar = PCV13  * Pneumovax = PPSV23 Adults 19-64 years old: 1-3 doses may be recommended based on certain risk factors  Adults 72 years old: Prevnar (PCV13) vaccine recommended followed by Pneumovax (PPSV23) vaccine  If already received PPSV23 since turning 65, then PCV13 recommended at least one year after PPSV23 dose  Hepatitis B Vaccine 3 dose series if at intermediate or high risk (ex: diabetes, end stage renal disease, liver disease)   Tetanus (Td) Vaccine - COST NOT COVERED BY MEDICARE PART B Following completion of primary series, a booster dose should be given every 10 years to maintain immunity against tetanus  Td may also be given as tetanus wound prophylaxis  Tdap Vaccine - COST NOT COVERED BY MEDICARE PART B Recommended at least once for all adults  For pregnant patients, recommended with each pregnancy  Shingles Vaccine (Shingrix) - COST NOT COVERED BY MEDICARE PART B  2 shot series recommended in those aged 48 and above     Health Maintenance Due:  There are no preventive care reminders to display for this patient  Immunizations Due:      Topic Date Due    Pneumococcal Vaccine: 65+ Years (2 of 2 - PPSV23) 08/25/2018    Influenza Vaccine (1) 09/01/2020     Advance Directives   What are advance directives? Advance directives are legal documents that state your wishes and plans for medical care  These plans are made ahead of time in case you lose your ability to make decisions for yourself  Advance directives can apply to any medical decision, such as the treatments you want, and if you want to donate organs  What are the types of advance directives? There are many types of advance directives, and each state has rules about how to use them  You may choose a combination of any of the following:  · Living will: This is a written record of the treatment you want  You can also choose which treatments you do not want, which to limit, and which to stop at a certain time  This includes surgery, medicine, IV fluid, and tube feedings     · Durable power of  for healthcare [facility]): This is a written record that states who you want to make healthcare choices for you when you are unable to make them for yourself  This person, called a proxy, is usually a family member or a friend  You may choose more than 1 proxy  · Do not resuscitate (DNR) order:  A DNR order is used in case your heart stops beating or you stop breathing  It is a request not to have certain forms of treatment, such as CPR  A DNR order may be included in other types of advance directives  · Medical directive: This covers the care that you want if you are in a coma, near death, or unable to make decisions for yourself  You can list the treatments you want for each condition  Treatment may include pain medicine, surgery, blood transfusions, dialysis, IV or tube feedings, and a ventilator (breathing machine)  · Values history: This document has questions about your views, beliefs, and how you feel and think about life  This information can help others choose the care that you would choose  Why are advance directives important? An advance directive helps you control your care  Although spoken wishes may be used, it is better to have your wishes written down  Spoken wishes can be misunderstood, or not followed  Treatments may be given even if you do not want them  An advance directive may make it easier for your family to make difficult choices about your care  © Copyright [employer] 2018 Information is for End User's use only and may not be sold, redistributed or otherwise used for commercial purposes   All illustrations and images included in CareNotes® are the copyrighted property of [employer]  or [address] [place], [place] [address]

## 2021-08-24 ENCOUNTER — OFFICE VISIT (OUTPATIENT)
Dept: FAMILY MEDICINE CLINIC | Facility: CLINIC | Age: 86
End: 2021-08-24
Payer: MEDICARE

## 2021-08-24 VITALS
SYSTOLIC BLOOD PRESSURE: 128 MMHG | BODY MASS INDEX: 22.45 KG/M2 | RESPIRATION RATE: 17 BRPM | DIASTOLIC BLOOD PRESSURE: 76 MMHG | HEIGHT: 78 IN | TEMPERATURE: 97.3 F | HEART RATE: 79 BPM | OXYGEN SATURATION: 97 % | WEIGHT: 194 LBS

## 2021-08-24 DIAGNOSIS — I73.9 PAD (PERIPHERAL ARTERY DISEASE) (HCC): ICD-10-CM

## 2021-08-24 DIAGNOSIS — I10 ESSENTIAL HYPERTENSION: ICD-10-CM

## 2021-08-24 DIAGNOSIS — Z79.899 ENCOUNTER FOR LONG-TERM CURRENT USE OF HIGH RISK MEDICATION: ICD-10-CM

## 2021-08-24 DIAGNOSIS — F02.80 LATE ONSET ALZHEIMER'S DISEASE WITHOUT BEHAVIORAL DISTURBANCE (HCC): Primary | ICD-10-CM

## 2021-08-24 DIAGNOSIS — Z79.899 POLYPHARMACY: ICD-10-CM

## 2021-08-24 DIAGNOSIS — G30.1 LATE ONSET ALZHEIMER'S DISEASE WITHOUT BEHAVIORAL DISTURBANCE (HCC): Primary | ICD-10-CM

## 2021-08-24 DIAGNOSIS — Z23 NEED FOR VACCINATION: ICD-10-CM

## 2021-08-24 DIAGNOSIS — Z79.899 ENCOUNTER FOR LONG-TERM (CURRENT) USE OF MEDICATIONS: ICD-10-CM

## 2021-08-24 DIAGNOSIS — H40.9 GLAUCOMA OF BOTH EYES, UNSPECIFIED GLAUCOMA TYPE: ICD-10-CM

## 2021-08-24 DIAGNOSIS — R26.2 AMBULATORY DYSFUNCTION: ICD-10-CM

## 2021-08-24 PROCEDURE — 99214 OFFICE O/P EST MOD 30 MIN: CPT | Performed by: FAMILY MEDICINE

## 2021-08-24 PROCEDURE — G0009 ADMIN PNEUMOCOCCAL VACCINE: HCPCS | Performed by: FAMILY MEDICINE

## 2021-08-24 PROCEDURE — 90732 PPSV23 VACC 2 YRS+ SUBQ/IM: CPT | Performed by: FAMILY MEDICINE

## 2021-08-24 RX ORDER — BRINZOLAMIDE 10 MG/ML
SUSPENSION/ DROPS OPHTHALMIC
COMMUNITY
Start: 2021-08-14

## 2021-08-24 RX ORDER — BRIMONIDINE TARTRATE 2 MG/ML
SOLUTION/ DROPS OPHTHALMIC
COMMUNITY
Start: 2021-08-06 | End: 2022-07-25

## 2021-08-24 RX ORDER — LOTEPREDNOL ETABONATE 5 MG/ML
SUSPENSION/ DROPS OPHTHALMIC
COMMUNITY
Start: 2021-07-23 | End: 2022-07-25

## 2021-08-24 NOTE — PROGRESS NOTES
· Assessment/Plan:  81 yo male, well-known to me for many years presents for f/u and evaluation of the following medical issues:     · F/u and eval HTN:  128/76 on Avapro and Atenolol    · F/u and eval NIDDM: no issue-- last BS 94 in Jan, 2021    · F/u CKD  3a:  gfr 49, BUN 31 and creat top N at 1 30 and stable    · F/u and eval HLD: on Zocor40 mg OD--goes to [facility] and labs there- due in Nov    · F/u and eval deconditioning:  Walks with cane  No falls  · F/u and eval polypharmacy: all meds reviewed and discussed:  Basically:  Atenolol, Avapro, Lasix, and Zocor    · F/u BNP - way back in Dec 2015: 212 (good) -- I don't see any since then, but wanted to comment on it  Problem List Items Addressed This Visit        Cardiovascular and Mediastinum    Essential hypertension    PAD (peripheral artery disease) (HCC)       Nervous and Auditory    Late onset Alzheimer'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 (HCC) - Primary       Other    Glaucoma of both eyes    Relevant Medications    brimonidine tartrate 0 2 % ophthalmic solution    brinzolamide (AZOPT) 1 % ophthalmic suspension    loteprednol etabonate (LOTEMAX) 0 5 % ophthalmic suspension      Other Visit Diagnoses     Encounter for long-term (current) use of medications        Encounter for long-term current use of high risk medication        Polypharmacy        Need for vaccination        Pneumonovax-23 given    Relevant Orders    PNEUMOCOCCAL POLYSACCHARIDE VACCINE 23-VALENT =>1YO SQ IM (Completed)    Ambulatory dysfunction                Subjective: 81 yo male in NAD, no falls, on meds, memory is the worst issue -- progressive x 5 yrs now     Patient ID: Vincent Cota is a 80 y o  male      HPI--:  81 yo male, well-known to me for many years presents for f/u and evaluation of the following medical issues:     F/u and eval HTN:  128/76 on Avapro and Atenolol    F/u and eval NIDDM: no issue-- last BS 94 in Jan    F/u and eval HLD: on Zocor40 mg OD--goes to [facility] and labs there- due in Nov    F/u and eval deconditioning:  Walks with cane  No falls  F/u and eval polypharmacy: all meds reviewed and discussed:  Basically:  Atenolol, Avapro, Lasix, and Zocor    The following portions of the patient's history were reviewed and updated as appropriate:   Past Medical History:  He has a past medical history of Cancer ([address] ), Heart disease, Hyperlipidemia, and Hypertension  ,  _______________________________________________________________________  Medical Problems:  does not have any pertinent problems on file ,  _______________________________________________________________________  Past Surgical History:   has a past surgical history that includes Excision basal cell carcinoma (10/11/2019); Coronary angioplasty with stent (01/01/2007); Cataract extraction, bilateral (Bilateral, 01/01/2014); Corneal transplant (Left, 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); and Skin cancer excision (Left, 04/28/2017)  ,  _______________________________________________________________________  Family History:  family history includes No Known Problems in his father and mother ,  _______________________________________________________________________  Social History:   reports that he has quit smoking  His smoking use included cigarettes  He has a 32 00 pack-year smoking history  He has never used smokeless tobacco  He reports current alcohol use  He reports that he does not use drugs  ,  _______________________________________________________________________  Allergies:  is allergic to gadolinium, contrast  [iodinated diagnostic agents], and iodine - food allergy     _______________________________________________________________________  Current Outpatient Medications   Medication Sig Dispense Refill    atenolol (TENORMIN) 25 mg tablet Take 25 mg by mouth daily      brimonidine (ALPHAGAN P) 0 15 % ophthalmic solution 1 drop 3 (three) times a day      brimonidine tartrate 0 2 % ophthalmic solution INSTILL 1 DROP IN EACH EYE THREE TIMES DAILY      brinzolamide (AZOPT) 1 % ophthalmic suspension SHAKE LIQUID AND INSTILL 1 DROP IN LEFT EYE THREE TIMES DAILY      calcium citrate-vitamin D (CITRACAL+D) 315-200 MG-UNIT per tablet Take 1 tablet by mouth daily      cholecalciferol (VITAMIN D3) 1,000 units tablet Take 1,000 Units by mouth daily      cyanocobalamin (VITAMIN B-12) 1,000 mcg tablet Take by mouth daily      furosemide (LASIX) 20 mg tablet TAKE 1 TABLET BY MOUTH EVERY DAY 30 tablet 6    Influenza Vac Split High-Dose (FLUZONE HIGH-DOSE) 0 5 ML ORLY Inject 0 5 mL into the shoulder, thigh, or buttocks once      irbesartan (AVAPRO) 150 mg tablet Take 150 mg by mouth daily at bedtime      ketorolac (ACULAR) 0 4 % SOLN 1 drop daily        latanoprost (XALATAN) 0 005 % ophthalmic solution 1 drop daily at bedtime      loteprednol etabonate (LOTEMAX) 0 5 % ophthalmic suspension INSTILL 1 DROP INTO LEFT EYE EVERY OTHER DAY      Magnesium 500 MG CAPS Take by mouth      nitroglycerin (NITROSTAT) 0 4 mg SL tablet Place 0 4 mg under the tongue      Omega-3 Fatty Acids (FISH OIL) 1,000 mg Take 1,200 mg by mouth daily      prednisoLONE acetate (PRED FORTE) 1 % ophthalmic suspension 1 drop daily      simvastatin (ZOCOR) 40 mg tablet TAKE 20MG (ONE-HALF TABLET) BY MOUTH QD5P FOR CHOLESTEROL      STARCH-MALTO DEXTRIN (Thick-It) POWD Take by mouth      thiamine (VITAMIN B1) 100 mg tablet Take 100 mg by mouth daily      albuterol (PROVENTIL HFA,VENTOLIN HFA) 90 mcg/act inhaler Inhale 2 puffs every 4 (four) hours as needed for wheezing      aspirin (ECOTRIN LOW STRENGTH) 81 mg EC tablet Take 81 mg by mouth daily       No current facility-administered medications for this visit      _______________________________________________________________________  Review of Systems   Constitutional: Negative for activity change, appetite change, chills, diaphoresis, fatigue, fever and unexpected weight change     HENT: Negative for congestion, dental problem, drooling, ear discharge, ear pain, facial swelling, mouth sores, nosebleeds, postnasal drip, rhinorrhea, trouble swallowing and voice change  Eyes: Negative for photophobia, pain, discharge, redness, itching and visual disturbance  Jos gann, and Dr Vashti Harada  Pressure up in L eye and they will erika in 2 wks  Respiratory: Negative for apnea, cough, choking, chest tightness and shortness of breath  [facility] [place] [facility] 2d shot in March, and will get booster when avilable  Cardiovascular: Negative for chest pain and leg swelling  Denies any and all cardiac symptoms   Gastrointestinal: Negative for abdominal distention, abdominal pain, constipation, diarrhea and nausea  Endocrine: Negative for polydipsia, polyphagia and polyuria  Genitourinary: Negative for decreased urine volume, difficulty urinating, dysuria, enuresis and hematuria  Musculoskeletal: Positive for arthralgias, back pain and neck pain  Negative for gait problem and joint swelling  Could C stenosis be partly responsible for his amb dysfunction? Skin: Negative for color change, pallor, rash and wound  Allergic/Immunologic: Negative for immunocompromised state  Neurological: Negative for dizziness, seizures, syncope, facial asymmetry, speech difficulty, light-headedness and headaches  Hematological: Negative for adenopathy  Psychiatric/Behavioral: Positive for confusion  Negative for agitation, behavioral problems and decreased concentration  Objective:  Vitals:    08/24/21 0826   BP: 128/76   BP Location: Left arm   Patient Position: Sitting   Cuff Size: Standard   Pulse: 79   Resp: 17   Temp: (!) 97 3 °F (36 3 °C)   TempSrc: Temporal   SpO2: 97%   Weight: 88 kg (194 lb)   Height: 6' 6" (1 981 m)     Body mass index is 22 42 kg/m²  Physical Exam  Vitals and nursing note reviewed     Constitutional: Appearance: Normal appearance  He is well-developed and normal weight  He is not diaphoretic  HENT:      Head: Normocephalic and atraumatic  Nose: Nose normal    Eyes:      Pupils: Pupils are equal, round, and reactive to light  Neck:      Trachea: No tracheal deviation  Cardiovascular:      Rate and Rhythm: Normal rate and regular rhythm  Heart sounds: Normal heart sounds  Comments: Follow with Dr Shay Rios, cardiooogist   For carotid tst Sept 2, 2021  Pulmonary:      Effort: Pulmonary effort is normal       Breath sounds: Normal breath sounds  No wheezing  Abdominal:      General: Bowel sounds are normal       Palpations: Abdomen is soft  Musculoskeletal:         General: Normal range of motion  Cervical back: Normal range of motion and neck supple  Lymphadenopathy:      Cervical: No cervical adenopathy  Skin:     General: Skin is warm and dry  Neurological:      Mental Status: He is alert and oriented to person, place, and time  Psychiatric:         Mood and Affect: Mood normal          Behavior: Behavior normal       Comments: Dementia persists       All labs at the [place]  Sees DR Shay Rios  What labs are here were reviewed  Needs Pneumovax-23--I gave that to him myself    30-31 min with pt and wife -many questions addressed, labs (best I could ), ambulation issues, labs back to Dec 2015 (BNP)

## 2021-08-24 NOTE — PATIENT INSTRUCTIONS
Chronic Hypertension   WHAT YOU NEED TO KNOW:   Hypertension is high blood pressure  Your blood pressure is the force of your blood moving against the walls of your arteries  Hypertension causes your blood pressure to get so high that your heart has to work much harder than normal  This can damage your heart  Even if you have hypertension for years, lifestyle changes, medicines, or both can help bring your blood pressure to normal   DISCHARGE INSTRUCTIONS:   Call your local emergency number (911 in the [address]) or have someone call if:   · You have chest pain  · You have any of the following signs of a heart attack:      ? Squeezing, pressure, or pain in your chest    ? You may  also have any of the following:     § Discomfort or pain in your back, neck, jaw, stomach, or arm    § Shortness of breath    § Nausea or vomiting    § Lightheadedness or a sudden cold sweat    · You become confused or have difficulty speaking  · You suddenly feel lightheaded or have trouble breathing  Return to the emergency department if:   · You have a severe headache or vision loss  · You have weakness in an arm or leg  Call your doctor if:   · You feel faint, dizzy, confused, or drowsy  · You have been taking your blood pressure medicine but your pressure is higher than your provider says it should be  · You have questions or concerns about your condition or care  Medicines: You may need any of the following:  · Antihypertensives  may be used to help lower your blood pressure  Several kinds of medicines are available  Your healthcare provider may change the medicine or medicines you currently take  This may be needed if your blood pressure is often high when you check it at home or you are having other problems with blood pressure control  · Diuretics  help decrease extra fluid that collects in your body  This will help lower your BP  You may urinate more often while you take this medicine      · Cholesterol medicine  helps lower your cholesterol level  A low cholesterol level helps prevent heart disease and makes it easier to control your blood pressure  · Take your medicine as directed  Contact your healthcare provider if you think your medicine is not helping or if you have side effects  Tell him or her if you are allergic to any medicine  Keep a list of the medicines, vitamins, and herbs you take  Include the amounts, and when and why you take them  Bring the list or the pill bottles to follow-up visits  Carry your medicine list with you in case of an emergency  Follow up with your doctor as directed: You will need to return to have your blood pressure checked and to have other lab tests done  Write down your questions so you remember to ask them during your visits  Stages of hypertension:       · Normal blood pressure is 119/79 or lower   Your healthcare provider may only check your blood pressure each year if it stays at a normal level  · Elevated blood pressure is 120/79 to 129/79   This is sometimes called prehypertension  Your healthcare provider may suggest lifestyle changes to help lower your blood pressure to a normal level  He or she may then check it again in 3 to 6 months  · Stage 1 hypertension is 130/80  to 139/89   Your provider may recommend lifestyle changes, medication, and checks every 3 to 6 months until your blood pressure is controlled  · Stage 2 hypertension is 140/90 or higher   Your provider will recommend lifestyle changes and have you take 2 kinds of hypertension medicines  You will also need to have your blood pressure checked monthly until it is controlled  Manage chronic hypertension:   · Check your blood pressure at home  Avoid smoking, caffeine, and exercise at least 30 minutes before checking your blood pressure  Sit and rest for 5 minutes before you take your blood pressure  Extend your arm and support it on a flat surface   Your arm should be at the same level as your heart  Follow the directions that came with your blood pressure monitor  Check your blood pressure 2 times, 1 minute apart, before you take your medicine in the morning  Also check your blood pressure before your evening meal  Keep a record of your readings and bring it to your follow-up visits  Ask your healthcare provider what your blood pressure should be  · Manage any other health conditions you have  Health conditions such as diabetes can increase your risk for hypertension  Follow your healthcare provider's instructions and take all your medicines as directed  Talk to your healthcare provider about any new health conditions you have recently developed  · Ask about all medicines  Certain medicines can increase your blood pressure  Examples include oral birth control pills, decongestants, herbal supplements, and NSAIDs, such as ibuprofen  Your healthcare provider can tell you which medicines are safe for you to take  This includes prescription and over-the-counter medicines  Lifestyle changes you can make to lower your blood pressure: Your provider may want you to make more lifestyle changes if you are having trouble controlling your blood pressure  This may feel difficult over time, especially if you think you are making good changes but your pressure is still high  It might help to focus on one new change at a time  For example, try to add 1 more day of exercise, or exercise for an extra 10 minutes on 2 days  Small changes can make a big difference  Your healthcare provider can also refer you to specialists such as a dietitian who can help you make small changes  Your family members may be included in helping you learn to create lifestyle changes, such as the following:  · Limit sodium (salt) as directed  Too much sodium can affect your fluid balance  Check labels to find low-sodium or no-salt-added foods  Some low-sodium foods use potassium salts for flavor   Too much potassium can also cause health problems  Your healthcare provider will tell you how much sodium and potassium are safe for you to have in a day  He or she may recommend that you limit sodium to 2,300 mg a day  · Follow the meal plan recommended by your healthcare provider  A dietitian or your provider can give you more information on low-sodium plans or the DASH (Dietary Approaches to Stop Hypertension) eating plan  The DASH plan is low in sodium, processed sugar, unhealthy fats, and total fat  It is high in potassium, calcium, and fiber  These can be found in vegetables, fruit, and whole-grain foods  · Be physically active throughout the day  Physical activity, such as exercise, can help control your blood pressure and your weight  Be physically active for at least 30 minutes per day, on most days of the week  Include aerobic activity, such as walking or riding a bicycle  Also include strength training at least 2 times each week  Your healthcare providers can help you create a physical activity plan  · Decrease stress  This may help lower your blood pressure  Learn ways to relax, such as deep breathing or listening to music  · Limit alcohol as directed  Alcohol can increase your blood pressure  A drink of alcohol is 12 ounces of beer, 5 ounces of wine, or 1½ ounces of liquor  · Do not smoke  Nicotine and other chemicals in cigarettes and cigars can increase your blood pressure and also cause lung damage  Ask your healthcare provider for information if you currently smoke and need help to quit  E-cigarettes or smokeless tobacco still contain nicotine  Talk to your healthcare provider before you use these products  © Copyright [employer] 2021 Information is for End User's use only and may not be sold, redistributed or otherwise used for commercial purposes   All illustrations and images included in CareNotes® are the copyrighted property of A D A M [employer]  or Kayla Ziadi  The above information is an  only  It is not intended as medical advice for individual conditions or treatments  Talk to your doctor, nurse or pharmacist before following any medical regimen to see if it is safe and effective for you

## 2021-10-19 DIAGNOSIS — I10 ESSENTIAL HYPERTENSION: ICD-10-CM

## 2021-10-19 RX ORDER — FUROSEMIDE 20 MG/1
TABLET ORAL
Qty: 30 TABLET | Refills: 6 | Status: SHIPPED | OUTPATIENT
Start: 2021-10-19

## 2022-02-03 DIAGNOSIS — E78.2 MIXED HYPERLIPIDEMIA: ICD-10-CM

## 2022-02-03 DIAGNOSIS — I10 ESSENTIAL HYPERTENSION: Primary | ICD-10-CM

## 2022-02-03 DIAGNOSIS — Z12.5 SCREENING PSA (PROSTATE SPECIFIC ANTIGEN): ICD-10-CM

## 2022-02-03 DIAGNOSIS — E55.9 VITAMIN D INSUFFICIENCY: ICD-10-CM

## 2022-02-11 ENCOUNTER — APPOINTMENT (OUTPATIENT)
Dept: LAB | Facility: AMBULARY SURGERY CENTER | Age: 87
End: 2022-02-11
Payer: MEDICARE

## 2022-02-11 DIAGNOSIS — I10 ESSENTIAL HYPERTENSION: ICD-10-CM

## 2022-02-11 DIAGNOSIS — E78.2 MIXED HYPERLIPIDEMIA: ICD-10-CM

## 2022-02-11 DIAGNOSIS — E55.9 VITAMIN D INSUFFICIENCY: ICD-10-CM

## 2022-02-11 DIAGNOSIS — Z12.5 SCREENING PSA (PROSTATE SPECIFIC ANTIGEN): ICD-10-CM

## 2022-02-11 LAB
25(OH)D3 SERPL-MCNC: 55.2 NG/ML (ref 30–100)
ALBUMIN SERPL BCP-MCNC: 4 G/DL (ref 3.5–5)
ALP SERPL-CCNC: 59 U/L (ref 46–116)
ALT SERPL W P-5'-P-CCNC: 18 U/L (ref 12–78)
ANION GAP SERPL CALCULATED.3IONS-SCNC: 5 MMOL/L (ref 4–13)
AST SERPL W P-5'-P-CCNC: 14 U/L (ref 5–45)
BASOPHILS # BLD AUTO: 0.1 THOUSANDS/ΜL (ref 0–0.1)
BASOPHILS NFR BLD AUTO: 2 % (ref 0–1)
BILIRUB SERPL-MCNC: 1.55 MG/DL (ref 0.2–1)
BILIRUB UR QL STRIP: NEGATIVE
BUN SERPL-MCNC: 42 MG/DL (ref 5–25)
CALCIUM SERPL-MCNC: 9.8 MG/DL (ref 8.3–10.1)
CHLORIDE SERPL-SCNC: 109 MMOL/L (ref 100–108)
CHOLEST SERPL-MCNC: 157 MG/DL
CLARITY UR: CLEAR
CO2 SERPL-SCNC: 27 MMOL/L (ref 21–32)
COLOR UR: YELLOW
CREAT SERPL-MCNC: 1.43 MG/DL (ref 0.6–1.3)
EOSINOPHIL # BLD AUTO: 0.13 THOUSAND/ΜL (ref 0–0.61)
EOSINOPHIL NFR BLD AUTO: 2 % (ref 0–6)
ERYTHROCYTE [DISTWIDTH] IN BLOOD BY AUTOMATED COUNT: 12.6 % (ref 11.6–15.1)
GFR SERPL CREATININE-BSD FRML MDRD: 43 ML/MIN/1.73SQ M
GLUCOSE P FAST SERPL-MCNC: 98 MG/DL (ref 65–99)
GLUCOSE UR STRIP-MCNC: NEGATIVE MG/DL
HCT VFR BLD AUTO: 40.9 % (ref 36.5–49.3)
HDLC SERPL-MCNC: 90 MG/DL
HGB BLD-MCNC: 12.6 G/DL (ref 12–17)
HGB UR QL STRIP.AUTO: NEGATIVE
IMM GRANULOCYTES # BLD AUTO: 0.02 THOUSAND/UL (ref 0–0.2)
IMM GRANULOCYTES NFR BLD AUTO: 0 % (ref 0–2)
KETONES UR STRIP-MCNC: NEGATIVE MG/DL
LDLC SERPL CALC-MCNC: 58 MG/DL (ref 0–100)
LEUKOCYTE ESTERASE UR QL STRIP: NEGATIVE
LYMPHOCYTES # BLD AUTO: 1.16 THOUSANDS/ΜL (ref 0.6–4.47)
LYMPHOCYTES NFR BLD AUTO: 20 % (ref 14–44)
MCH RBC QN AUTO: 30.4 PG (ref 26.8–34.3)
MCHC RBC AUTO-ENTMCNC: 30.8 G/DL (ref 31.4–37.4)
MCV RBC AUTO: 99 FL (ref 82–98)
MONOCYTES # BLD AUTO: 0.43 THOUSAND/ΜL (ref 0.17–1.22)
MONOCYTES NFR BLD AUTO: 8 % (ref 4–12)
NEUTROPHILS # BLD AUTO: 3.89 THOUSANDS/ΜL (ref 1.85–7.62)
NEUTS SEG NFR BLD AUTO: 68 % (ref 43–75)
NITRITE UR QL STRIP: NEGATIVE
NONHDLC SERPL-MCNC: 67 MG/DL
NRBC BLD AUTO-RTO: 0 /100 WBCS
PH UR STRIP.AUTO: 6 [PH]
PLATELET # BLD AUTO: 112 THOUSANDS/UL (ref 149–390)
PMV BLD AUTO: 12 FL (ref 8.9–12.7)
POTASSIUM SERPL-SCNC: 4.9 MMOL/L (ref 3.5–5.3)
PROT SERPL-MCNC: 6.7 G/DL (ref 6.4–8.2)
PROT UR STRIP-MCNC: NEGATIVE MG/DL
PSA SERPL-MCNC: 0.3 NG/ML (ref 0–4)
RBC # BLD AUTO: 4.15 MILLION/UL (ref 3.88–5.62)
SODIUM SERPL-SCNC: 141 MMOL/L (ref 136–145)
SP GR UR STRIP.AUTO: 1.02 (ref 1–1.03)
TRIGL SERPL-MCNC: 46 MG/DL
TSH SERPL DL<=0.05 MIU/L-ACNC: 1.15 UIU/ML (ref 0.36–3.74)
UROBILINOGEN UR QL STRIP.AUTO: 0.2 E.U./DL
WBC # BLD AUTO: 5.73 THOUSAND/UL (ref 4.31–10.16)

## 2022-02-11 PROCEDURE — 80053 COMPREHEN METABOLIC PANEL: CPT

## 2022-02-11 PROCEDURE — 80061 LIPID PANEL: CPT

## 2022-02-11 PROCEDURE — 84443 ASSAY THYROID STIM HORMONE: CPT

## 2022-02-11 PROCEDURE — 85025 COMPLETE CBC W/AUTO DIFF WBC: CPT

## 2022-02-11 PROCEDURE — G0103 PSA SCREENING: HCPCS

## 2022-02-11 PROCEDURE — 82306 VITAMIN D 25 HYDROXY: CPT

## 2022-02-11 PROCEDURE — 81003 URINALYSIS AUTO W/O SCOPE: CPT

## 2022-02-11 PROCEDURE — 36415 COLL VENOUS BLD VENIPUNCTURE: CPT

## 2022-04-21 ENCOUNTER — OFFICE VISIT (OUTPATIENT)
Dept: FAMILY MEDICINE CLINIC | Facility: CLINIC | Age: 87
End: 2022-04-21
Payer: MEDICARE

## 2022-04-21 VITALS
HEART RATE: 55 BPM | WEIGHT: 186.6 LBS | OXYGEN SATURATION: 92 % | TEMPERATURE: 96.6 F | BODY MASS INDEX: 21.59 KG/M2 | HEIGHT: 78 IN | DIASTOLIC BLOOD PRESSURE: 62 MMHG | SYSTOLIC BLOOD PRESSURE: 110 MMHG | RESPIRATION RATE: 22 BRPM

## 2022-04-21 DIAGNOSIS — D69.6 PLATELETS DECREASED (HCC): ICD-10-CM

## 2022-04-21 DIAGNOSIS — Z79.899 ENCOUNTER FOR LONG-TERM (CURRENT) USE OF MEDICATIONS: ICD-10-CM

## 2022-04-21 DIAGNOSIS — G30.1 LATE ONSET ALZHEIMER'S DISEASE WITHOUT BEHAVIORAL DISTURBANCE (HCC): ICD-10-CM

## 2022-04-21 DIAGNOSIS — I10 ESSENTIAL HYPERTENSION: Primary | ICD-10-CM

## 2022-04-21 DIAGNOSIS — N18.32 STAGE 3B CHRONIC KIDNEY DISEASE (HCC): ICD-10-CM

## 2022-04-21 DIAGNOSIS — Z00.00 ENCOUNTER FOR MEDICARE ANNUAL WELLNESS EXAM: ICD-10-CM

## 2022-04-21 DIAGNOSIS — E78.2 MIXED HYPERLIPIDEMIA: ICD-10-CM

## 2022-04-21 DIAGNOSIS — J43.9 PULMONARY EMPHYSEMA, UNSPECIFIED EMPHYSEMA TYPE (HCC): ICD-10-CM

## 2022-04-21 DIAGNOSIS — F02.80 LATE ONSET ALZHEIMER'S DISEASE WITHOUT BEHAVIORAL DISTURBANCE (HCC): ICD-10-CM

## 2022-04-21 DIAGNOSIS — I73.9 PAD (PERIPHERAL ARTERY DISEASE) (HCC): ICD-10-CM

## 2022-04-21 DIAGNOSIS — R26.2 AMBULATORY DYSFUNCTION: ICD-10-CM

## 2022-04-21 PROCEDURE — 99214 OFFICE O/P EST MOD 30 MIN: CPT | Performed by: FAMILY MEDICINE

## 2022-04-21 PROCEDURE — 1123F ACP DISCUSS/DSCN MKR DOCD: CPT | Performed by: FAMILY MEDICINE

## 2022-04-21 PROCEDURE — G0439 PPPS, SUBSEQ VISIT: HCPCS | Performed by: FAMILY MEDICINE

## 2022-04-21 RX ORDER — TRAMADOL HYDROCHLORIDE 50 MG/1
50 TABLET ORAL EVERY 6 HOURS PRN
COMMUNITY
Start: 2021-12-14 | End: 2022-07-25

## 2022-04-21 RX ORDER — DOXYCYCLINE HYCLATE 100 MG
100 TABLET ORAL 2 TIMES DAILY WITH MEALS
COMMUNITY
Start: 2022-01-21 | End: 2022-07-25

## 2022-04-21 RX ORDER — ACETAMINOPHEN 500 MG
1000 TABLET ORAL EVERY 8 HOURS PRN
COMMUNITY
Start: 2021-12-14

## 2022-04-21 NOTE — PROGRESS NOTES
Assessment and Plan:     Problem List Items Addressed This Visit        Cardiovascular and Mediastinum    Essential hypertension - Primary    Relevant Orders    Comprehensive metabolic panel    CBC and differential    PAD (peripheral artery disease) (HCC)       Nervous and Auditory    Late onset Alzheimer'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 ([facility] )       Other    Mixed hyperlipidemia    Platelets decreased ([facility] )      Other Visit Diagnoses     Ambulatory dysfunction        Walks with single-point cane all the time    Encounter for Medicare annual wellness exam        Encounter for long-term (current) use of medications        Continues Zocor it irbesartan atenolol baby aspirin and multiple other eyedrops--7th   also Lasix    Relevant Orders    Comprehensive metabolic panel    CBC and differential    Stage 3b chronic kidney disease (HCC)        Relevant Orders    Comprehensive metabolic panel    CBC and differential    Pulmonary emphysema, unspecified emphysema type ([facility] )              Depression Screening and Follow-up Plan: Patient was screened for depression during today's encounter  They screened negative with a PHQ-2 score of 0  Preventive health issues were discussed with patient, and age appropriate screening tests were ordered as noted in patient's After Visit Summary  Personalized health advice and appropriate referrals for health education or preventive services given if needed, as noted in patient's After Visit Summary       History of Present Illness:     Patient presents for Medicare Annual Wellness visit    Patient Care Team:  Royer Fink DO as PCP - General (Family Medicine)     Problem List:     Patient Active Problem List   Diagnosis    Essential hypertension    Mixed hyperlipidemia    Simple chronic bronchitis (HCC)    Chronic inflammatory demyelinating polyneuropathy ([facility] )    Late onset Alzheimer'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 ([facility] )    PAD (peripheral artery disease) ([facility] )  Glaucoma of both eyes    [facility]      Past Medical and Surgical History:     Past Medical History:   Diagnosis Date    Cancer ([facility] )     skin; basal cell carcinoma     Heart disease     Hyperlipidemia     Hypertension      Past Surgical History:   Procedure Laterality Date    BASAL CELL CARCINOMA EXCISION  10/11/2019    Removed from back and face    CATARACT EXTRACTION, BILATERAL Bilateral 2014    CORNEAL TRANSPLANT Left 2015    CORONARY ANGIOPLASTY WITH STENT PLACEMENT  2007    SKIN CANCER EXCISION Left 2017    skin cancer removed from left side of face      Family History:     Family History   Problem Relation Age of Onset    No Known Problems Mother     No Known Problems Father       Social History:     Social History     Socioeconomic History    Marital status: /Civil Union     Spouse name: Cheikh Bills Number of children: None    Years of education: None    Highest education level: None   Occupational History    None   Tobacco Use    Smoking status: Former Smoker     Packs/day: 1 00     Years: 32 00     Pack years: 32 00     Types: Cigarettes    Smokeless tobacco: Never Used    Tobacco comment: smoked age [age], about 1 ppd    Vaping Use    Vaping Use: Never used   Substance and Sexual Activity    Alcohol use: Yes    Drug use: No    Sexual activity: None   Other Topics Concern    None   Social History Narrative    · Most recent tobacco use screenin-      · Do you currently or have you served in [employer]: Yes      · If Yes, What branch of service:   [employer]      · Were you activated, into active duty, as a member of the [employer] or as a Reservist:Yes, [employer]                   [employer]     · Diet:   Regular      · General stress level:   Low      · Guns present in home:   No      · Seat belts used routinely:   Yes      · Smoke alarm in home: Yes      · Advance directive:    Yes      · Live alone or with others: with others      · Are there stairs in your home:    Yes      · Pets:   No      Social Determinants of Health     Financial Resource Strain: Not on file   Food Insecurity: Not on file   Transportation Needs: Not on file   Physical Activity: Not on file   Stress: Not on file   Social Connections: Not on file   Intimate Partner Violence: Not on file   Housing Stability: Not on file      Medications and Allergies:     Current Outpatient Medications   Medication Sig Dispense Refill    acetaminophen (TYLENOL) 500 mg tablet Take 1,000 mg by mouth every 8 (eight) hours as needed      traMADol (ULTRAM) 50 mg tablet Take 50 mg by mouth every 6 (six) hours as needed      albuterol (PROVENTIL HFA,VENTOLIN HFA) 90 mcg/act inhaler Inhale 2 puffs every 4 (four) hours as needed for wheezing      aspirin (ECOTRIN LOW STRENGTH) 81 mg EC tablet Take 81 mg by mouth daily      atenolol (TENORMIN) 25 mg tablet Take 25 mg by mouth daily      brimonidine (ALPHAGAN P) 0 15 % ophthalmic solution 1 drop 3 (three) times a day      brimonidine tartrate 0 2 % ophthalmic solution INSTILL 1 DROP IN EACH EYE THREE TIMES DAILY      brinzolamide (AZOPT) 1 % ophthalmic suspension SHAKE LIQUID AND INSTILL 1 DROP IN LEFT EYE THREE TIMES DAILY      calcium citrate-vitamin D (CITRACAL+D) 315-200 MG-UNIT per tablet Take 1 tablet by mouth daily      cholecalciferol (VITAMIN D3) 1,000 units tablet Take 1,000 Units by mouth daily      cyanocobalamin (VITAMIN B-12) 1,000 mcg tablet Take by mouth daily      doxycycline hyclate (VIBRA-TABS) 100 mg tablet Take 100 mg by mouth 2 (two) times a day with meals      furosemide (LASIX) 20 mg tablet TAKE 1 TABLET BY MOUTH EVERY DAY 30 tablet 6    Influenza Vac Split High-Dose (FLUZONE HIGH-DOSE) 0 5 ML ORLY Inject 0 5 mL into the shoulder, thigh, or buttocks once      irbesartan (AVAPRO) 150 mg tablet Take 150 mg by mouth daily at bedtime      ketorolac (ACULAR) 0 4 % SOLN 1 drop daily        latanoprost (XALATAN) 0 005 % ophthalmic solution 1 drop daily at bedtime      loteprednol etabonate (LOTEMAX) 0 5 % ophthalmic suspension INSTILL 1 DROP INTO LEFT EYE EVERY OTHER DAY      Magnesium 500 MG CAPS Take by mouth      mupirocin (BACTROBAN) 2 % ointment APPLY TO WOUND DAILY WITH EACH DRESSING CHANGE      nitroglycerin (NITROSTAT) 0 4 mg SL tablet Place 0 4 mg under the tongue      Omega-3 Fatty Acids (FISH OIL) 1,000 mg Take 1,200 mg by mouth daily      prednisoLONE acetate (PRED FORTE) 1 % ophthalmic suspension 1 drop daily      simvastatin (ZOCOR) 40 mg tablet TAKE 20MG (ONE-HALF TABLET) BY MOUTH QD5P FOR CHOLESTEROL      STARCH-MALTO DEXTRIN (Thick-It) POWD Take by mouth      thiamine (VITAMIN B1) 100 mg tablet Take 100 mg by mouth daily       No current facility-administered medications for this visit  Allergies   Allergen Reactions    Gadolinium Itching    Contrast  [Iodinated Diagnostic Agents] Rash    Iodine - Food Allergy Rash      Immunizations:     Immunization History   Administered Date(s) Administered    COVID-19 MODERNA VACC 0 5 ML IM 02/08/2021, 03/08/2021, 12/03/2021    INFLUENZA 10/01/2019, 09/01/2020    Influenza Split High Dose Preservative Free IM 09/13/2018    Pneumococcal 11/05/2002    Pneumococcal Conjugate 13-Valent 08/25/2017    Pneumococcal Polysaccharide PPV23 08/24/2021    Tdap 08/25/2017    influenza, trivalent, adjuvanted 10/24/2019      Health Maintenance: There are no preventive care reminders to display for this patient  There are no preventive care reminders to display for this patient  Medicare Health Risk Assessment:     /62   Pulse 55   Temp (!) 96 6 °F (35 9 °C)   Resp 22   Ht 6' 6" (1 981 m)   Wt 84 6 kg (186 lb 9 6 oz)   SpO2 92%   BMI 21 56 kg/m²      Scottjuan Rocha is here for his Subsequent Wellness visit  Health Risk Assessment:   Patient rates overall health as good   Patient feels that their physical health rating is slightly worse  Patient is satisfied with their life  Eyesight was rated as slightly worse  Hearing was rated as much worse  Patient feels that their emotional and mental health rating is same  Patients states they are never, rarely angry  Patient states they are never, rarely unusually tired/fatigued  Pain experienced in the last 7 days has been none  Patient states that he has experienced no weight loss or gain in last 6 months  Depression Screening:   PHQ-2 Score: 0      Fall Risk Screening: In the past year, patient has experienced: no history of falling in past year      Home Safety:  Patient has trouble with stairs inside or outside of their home  Patient has working smoke alarms and has working carbon monoxide detector  Home safety hazards include: none  Nutrition:   Current diet is Regular  Medications:   Patient is currently taking over-the-counter supplements  OTC medications include: see medication list  Patient is not able to manage medications  Activities of Daily Living (ADLs)/Instrumental Activities of Daily Living (IADLs):   Walk and transfer into and out of bed and chair?: Yes  Dress and groom yourself?: Yes    Bathe or shower yourself?: Yes    Feed yourself? Yes  Do your laundry/housekeeping?: No  Manage your money, pay your bills and track your expenses?: No  Make your own meals?: No    Do your own shopping?: No    Previous Hospitalizations:   Any hospitalizations or ED visits within the last 12 months?: Yes    How many hospitalizations have you had in the last year?: 1-2    Advance Care Planning:   Living will: Yes    Durable POA for healthcare:  Yes    Advanced directive: Yes    Advanced directive counseling given: Yes    Five wishes given: Yes    Patient declined ACP directive: No    End of Life Decisions reviewed with patient: Yes    Provider agrees with end of life decisions: Yes      Cognitive Screening:   Provider or family/friend/caregiver concerned regarding cognition?: Yes  Mini-Mental Status Exam (MMSE) Score: 6  Interpretation: MMSE Score 0-9: Severe dementia    PREVENTIVE SCREENINGS      Cardiovascular Screening:    General: Screening Not Indicated, History Lipid Disorder and Risks and Benefits Discussed      Diabetes Screening:     General: Screening Current and Risks and Benefits Discussed      Colorectal Cancer Screening:     General: Screening Not Indicated and Risks and Benefits Discussed      Prostate Cancer Screening:    General: Screening Not Indicated and Risks and Benefits Discussed      Osteoporosis Screening:    General: Risks and Benefits Discussed      Abdominal Aortic Aneurysm (AAA) Screening:    Risk factors include: tobacco use        Lung Cancer Screening:     General: Screening Not Indicated and Risks and Benefits Discussed      Hepatitis C Screening:    General: Risks and Benefits Discussed      Preventive Screening Comments: Never had polyps, last colonoscopy bout 10 yrs ago  Dr Fransisco Gross smoking age 48-- 45 yrs ago  Was 1/2 ppd smoker?  "not mutch"      Screening, Brief Intervention, and Referral to Treatment (SBIRT)    Screening  Typical number of drinks in a day: 0  Typical number of drinks in a week: 0  Interpretation: Low risk drinking behavior  Single Item Drug Screening:  How often have you used an illegal drug (including marijuana) or a prescription medication for non-medical reasons in the past year? never    Single Item Drug Screen Score: 0  Interpretation: Negative screen for possible drug use disorder    Other Counseling Topics:   Car/seat belt/driving safety, skin self-exam, sunscreen and calcium and vitamin D intake and regular weightbearing exercise         Whitney Webb DO

## 2022-04-21 NOTE — PATIENT INSTRUCTIONS
Medicare Preventive Visit Patient Instructions  Thank you for completing your Welcome to Medicare Visit or Medicare Annual Wellness Visit today  Your next wellness visit will be due in one year (4/22/2023)  The screening/preventive services that you may require over the next 5-10 years are detailed below  Some tests may not apply to you based off risk factors and/or age  Screening tests ordered at today's visit but not completed yet may show as past due  Also, please note that scanned in results may not display below  Preventive Screenings:  Service Recommendations Previous Testing/Comments   Colorectal Cancer Screening  · Colonoscopy    · Fecal Occult Blood Test (FOBT)/Fecal Immunochemical Test (FIT)  · Fecal DNA/Cologuard Test  · Flexible Sigmoidoscopy Age: 54-65 years old   Colonoscopy: every 10 years (May be performed more frequently if at higher risk)  OR  FOBT/FIT: every 1 year  OR  Cologuard: every 3 years  OR  Sigmoidoscopy: every 5 years  Screening may be recommended earlier than age 48 if at higher risk for colorectal cancer  Also, an individualized decision between you and your healthcare provider will decide whether screening between the ages of 74-80 would be appropriate   Colonoscopy: Not on file  FOBT/FIT: Not on file  Cologuard: Not on file  Sigmoidoscopy: Not on file    Screening Not Indicated     Prostate Cancer Screening Individualized decision between patient and health care provider in men between ages of 53-78   Medicare will cover every 12 months beginning on the day after your 50th birthday PSA: 0 3 ng/mL     Screening Not Indicated     Hepatitis C Screening Once for adults born between 1945 and 1965  More frequently in patients at high risk for Hepatitis C Hep C Antibody: Not on file        Diabetes Screening 1-2 times per year if you're at risk for diabetes or have pre-diabetes Fasting glucose: 98 mg/dL   A1C: No results in last 5 years    Screening Current   Cholesterol Screening Once every 5 years if you don't have a lipid disorder  May order more often based on risk factors  Lipid panel: 02/11/2022    Screening Not Indicated  History Lipid Disorder      Other Preventive Screenings Covered by Medicare:  1  Abdominal Aortic Aneurysm (AAA) Screening: covered once if your at risk  You're considered to be at risk if you have a family history of AAA or a male between the age of 73-68 who smoking at least 100 cigarettes in your lifetime  2  Lung Cancer Screening: covers low dose CT scan once per year if you meet all of the following conditions: (1) Age 50-69; (2) No signs or symptoms of lung cancer; (3) Current smoker or have quit smoking within the last 15 years; (4) You have a tobacco smoking history of at least 30 pack years (packs per day x number of years you smoked); (5) You get a written order from a healthcare provider  3  Glaucoma Screening: covered annually if you're considered high risk: (1) You have diabetes OR (2) Family history of glaucoma OR (3)  aged 48 and older OR (3)  American aged 72 and older  3  Osteoporosis Screening: covered every 2 years if you meet one of the following conditions: (1) Have a vertebral abnormality; (2) On glucocorticoid therapy for more than 3 months; (3) Have primary hyperparathyroidism; (4) On osteoporosis medications and need to assess response to drug therapy  5  HIV Screening: covered annually if you're between the age of 12-76  Also covered annually if you are younger than 13 and older than 72 with risk factors for HIV infection  For pregnant patients, it is covered up to 3 times per pregnancy      Immunizations:  Immunization Recommendations   Influenza Vaccine Annual influenza vaccination during flu season is recommended for all persons aged >= 6 months who do not have contraindications   Pneumococcal Vaccine (Prevnar and Pneumovax)  * Prevnar = PCV13  * Pneumovax = PPSV23 Adults 25-60 years old: 1-3 doses may be recommended based on certain risk factors  Adults 72 years old: Prevnar (PCV13) vaccine recommended followed by Pneumovax (PPSV23) vaccine  If already received PPSV23 since turning 65, then PCV13 recommended at least one year after PPSV23 dose  Hepatitis B Vaccine 3 dose series if at intermediate or high risk (ex: diabetes, end stage renal disease, liver disease)   Tetanus (Td) Vaccine - COST NOT COVERED BY MEDICARE PART B Following completion of primary series, a booster dose should be given every 10 years to maintain immunity against tetanus  Td may also be given as tetanus wound prophylaxis  Tdap Vaccine - COST NOT COVERED BY MEDICARE PART B Recommended at least once for all adults  For pregnant patients, recommended with each pregnancy  Shingles Vaccine (Shingrix) - COST NOT COVERED BY MEDICARE PART B  2 shot series recommended in those aged 48 and above     Health Maintenance Due:  There are no preventive care reminders to display for this patient  Immunizations Due:  There are no preventive care reminders to display for this patient  Advance Directives   What are advance directives? Advance directives are legal documents that state your wishes and plans for medical care  These plans are made ahead of time in case you lose your ability to make decisions for yourself  Advance directives can apply to any medical decision, such as the treatments you want, and if you want to donate organs  What are the types of advance directives? There are many types of advance directives, and each state has rules about how to use them  You may choose a combination of any of the following:  · Living will: This is a written record of the treatment you want  You can also choose which treatments you do not want, which to limit, and which to stop at a certain time  This includes surgery, medicine, IV fluid, and tube feedings  · Durable power of  for healthcare [place] [facility]):   This is a written record that states who you want to make healthcare choices for you when you are unable to make them for yourself  This person, called a proxy, is usually a family member or a friend  You may choose more than 1 proxy  · Do not resuscitate (DNR) order:  A DNR order is used in case your heart stops beating or you stop breathing  It is a request not to have certain forms of treatment, such as CPR  A DNR order may be included in other types of advance directives  · Medical directive: This covers the care that you want if you are in a coma, near death, or unable to make decisions for yourself  You can list the treatments you want for each condition  Treatment may include pain medicine, surgery, blood transfusions, dialysis, IV or tube feedings, and a ventilator (breathing machine)  · Values history: This document has questions about your views, beliefs, and how you feel and think about life  This information can help others choose the care that you would choose  Why are advance directives important? An advance directive helps you control your care  Although spoken wishes may be used, it is better to have your wishes written down  Spoken wishes can be misunderstood, or not followed  Treatments may be given even if you do not want them  An advance directive may make it easier for your family to make difficult choices about your care  © Copyright [employer] 2018 Information is for End User's use only and may not be sold, redistributed or otherwise used for commercial purposes   All illustrations and images included in CareNotes® are the copyrighted property of [employer]  or [address] [place] [address] [employer]

## 2022-04-21 NOTE — PROGRESS NOTES
Assessment/Plan:Almost 79 yo male, in NAD, here for wellness eval and gen med OV with the many medical issues as listed:    Depression Screening and Follow-up Plan: Patient was screened for depression during today's encounter  They screened negative with a PHQ-2 score of 0          1  Essential hypertension  Comments:  Aches Avapro 150 mg once daily and atenolol 25 mg once daily  Blood pressure 110/62--well controlled  Orders:  -     Comprehensive metabolic panel; Future; Expected date: 04/28/2022  -     CBC and differential; Future    2  Mixed hyperlipidemia  Comments:  Taking Zocor 40 m g OD    3  Ambulatory dysfunction  Comments:  Walks with single-point cane all the time    4  Encounter for Medicare annual wellness exam    5  Encounter for long-term (current) use of medications  Comments:  Continues Zocor it irbesartan atenolol baby aspirin and multiple other eyedrops--7th   also Lasix  Orders:  -     Comprehensive metabolic panel; Future; Expected date: 04/28/2022  -     CBC and differential; Future    6  Stage 3b chronic kidney disease ([facility] )  -     Comprehensive metabolic panel; Future; Expected date: 04/28/2022  -     CBC and differential; Future    7  Platelets decreased ([address] )    8  Late onset Alzheimer'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 ([address] )    9  Pulmonary emphysema, unspecified emphysema type ([address] )    10  PAD (peripheral artery disease) ([facility])        Subjective:      Patient ID: Jesus Paula is a 80 y o  male  HPI    The following portions of the patient's history were reviewed and updated as appropriate: He  has a past medical history of Cancer ([address] ), Heart disease, Hyperlipidemia, and Hypertension    He   Patient Active Problem List    Diagnosis Date Noted    Platelets decreased ([address] ) 04/21/2022    Glaucoma of both eyes 08/24/2021    PAD (peripheral artery disease) ([address] ) 02/17/2021    Essential hypertension 06/17/2020    Mixed hyperlipidemia 06/17/2020    Simple chronic bronchitis ([place] [id number] ) 06/17/2020    Chronic inflammatory demyelinating polyneuropathy ([place] [address] ) 06/17/2020    Late onset Alzheimer'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 ([place] [address] ) 06/17/2020     He  has a past surgical history that includes Excision basal cell carcinoma (10/11/2019); Coronary angioplasty with stent (01/01/2007); Cataract extraction, bilateral (Bilateral, 01/01/2014); Corneal transplant (Left, 01/01/2015); and Skin cancer excision (Left, 04/28/2017)  His family history includes No Known Problems in his father and mother  He  reports that he has quit smoking  His smoking use included cigarettes  He has a 32 00 pack-year smoking history  He has never used smokeless tobacco  He reports current alcohol use  He reports that he does not use drugs    Current Outpatient Medications   Medication Sig Dispense Refill    acetaminophen (TYLENOL) 500 mg tablet Take 1,000 mg by mouth every 8 (eight) hours as needed      traMADol (ULTRAM) 50 mg tablet Take 50 mg by mouth every 6 (six) hours as needed      albuterol (PROVENTIL HFA,VENTOLIN HFA) 90 mcg/act inhaler Inhale 2 puffs every 4 (four) hours as needed for wheezing      aspirin (ECOTRIN LOW STRENGTH) 81 mg EC tablet Take 81 mg by mouth daily      atenolol (TENORMIN) 25 mg tablet Take 25 mg by mouth daily      brimonidine (ALPHAGAN P) 0 15 % ophthalmic solution 1 drop 3 (three) times a day      brimonidine tartrate 0 2 % ophthalmic solution INSTILL 1 DROP IN EACH EYE THREE TIMES DAILY      brinzolamide (AZOPT) 1 % ophthalmic suspension SHAKE LIQUID AND INSTILL 1 DROP IN LEFT EYE THREE TIMES DAILY      calcium citrate-vitamin D (CITRACAL+D) 315-200 MG-UNIT per tablet Take 1 tablet by mouth daily      cholecalciferol (VITAMIN D3) 1,000 units tablet Take 1,000 Units by mouth daily      cyanocobalamin (VITAMIN B-12) 1,000 mcg tablet Take by mouth daily      doxycycline hyclate (VIBRA-TABS) 100 mg tablet Take 100 mg by mouth 2 (two) times a day with meals      furosemide (LASIX) 20 mg tablet TAKE 1 TABLET BY MOUTH EVERY DAY 30 tablet 6    Influenza Vac Split High-Dose (FLUZONE HIGH-DOSE) 0 5 ML ORLY Inject 0 5 mL into the shoulder, thigh, or buttocks once      irbesartan (AVAPRO) 150 mg tablet Take 150 mg by mouth daily at bedtime      ketorolac (ACULAR) 0 4 % SOLN 1 drop daily        latanoprost (XALATAN) 0 005 % ophthalmic solution 1 drop daily at bedtime      loteprednol etabonate (LOTEMAX) 0 5 % ophthalmic suspension INSTILL 1 DROP INTO LEFT EYE EVERY OTHER DAY      Magnesium 500 MG CAPS Take by mouth      mupirocin (BACTROBAN) 2 % ointment APPLY TO WOUND DAILY WITH EACH DRESSING CHANGE      nitroglycerin (NITROSTAT) 0 4 mg SL tablet Place 0 4 mg under the tongue      Omega-3 Fatty Acids (FISH OIL) 1,000 mg Take 1,200 mg by mouth daily      prednisoLONE acetate (PRED FORTE) 1 % ophthalmic suspension 1 drop daily      simvastatin (ZOCOR) 40 mg tablet TAKE 20MG (ONE-HALF TABLET) BY MOUTH QD5P FOR CHOLESTEROL      STARCH-MALTO DEXTRIN (Thick-It) POWD Take by mouth      thiamine (VITAMIN B1) 100 mg tablet Take 100 mg by mouth daily       No current facility-administered medications for this visit       Current Outpatient Medications on File Prior to Visit   Medication Sig    acetaminophen (TYLENOL) 500 mg tablet Take 1,000 mg by mouth every 8 (eight) hours as needed    traMADol (ULTRAM) 50 mg tablet Take 50 mg by mouth every 6 (six) hours as needed    albuterol (PROVENTIL HFA,VENTOLIN HFA) 90 mcg/act inhaler Inhale 2 puffs every 4 (four) hours as needed for wheezing    aspirin (ECOTRIN LOW STRENGTH) 81 mg EC tablet Take 81 mg by mouth daily    atenolol (TENORMIN) 25 mg tablet Take 25 mg by mouth daily    brimonidine (ALPHAGAN P) 0 15 % ophthalmic solution 1 drop 3 (three) times a day    brimonidine tartrate 0 2 % ophthalmic solution INSTILL 1 DROP IN EACH EYE THREE TIMES DAILY    brinzolamide (AZOPT) 1 % ophthalmic suspension SHAKE LIQUID AND INSTILL 1 DROP IN LEFT EYE THREE TIMES DAILY    calcium citrate-vitamin D (CITRACAL+D) 315-200 MG-UNIT per tablet Take 1 tablet by mouth daily    cholecalciferol (VITAMIN D3) 1,000 units tablet Take 1,000 Units by mouth daily    cyanocobalamin (VITAMIN B-12) 1,000 mcg tablet Take by mouth daily    doxycycline hyclate (VIBRA-TABS) 100 mg tablet Take 100 mg by mouth 2 (two) times a day with meals    furosemide (LASIX) 20 mg tablet TAKE 1 TABLET BY MOUTH EVERY DAY    Influenza Vac Split High-Dose (FLUZONE HIGH-DOSE) 0 5 ML ORLY Inject 0 5 mL into the shoulder, thigh, or buttocks once    irbesartan (AVAPRO) 150 mg tablet Take 150 mg by mouth daily at bedtime    ketorolac (ACULAR) 0 4 % SOLN 1 drop daily      latanoprost (XALATAN) 0 005 % ophthalmic solution 1 drop daily at bedtime    loteprednol etabonate (LOTEMAX) 0 5 % ophthalmic suspension INSTILL 1 DROP INTO LEFT EYE EVERY OTHER DAY    Magnesium 500 MG CAPS Take by mouth    mupirocin (BACTROBAN) 2 % ointment APPLY TO WOUND DAILY WITH EACH DRESSING CHANGE    nitroglycerin (NITROSTAT) 0 4 mg SL tablet Place 0 4 mg under the tongue    Omega-3 Fatty Acids (FISH OIL) 1,000 mg Take 1,200 mg by mouth daily    prednisoLONE acetate (PRED FORTE) 1 % ophthalmic suspension 1 drop daily    simvastatin (ZOCOR) 40 mg tablet TAKE 20MG (ONE-HALF TABLET) BY MOUTH QD5P FOR CHOLESTEROL    STARCH-MALTO DEXTRIN (Thick-It) POWD Take by mouth    thiamine (VITAMIN B1) 100 mg tablet Take 100 mg by mouth daily     No current facility-administered medications on file prior to visit  He is allergic to gadolinium, contrast  [iodinated diagnostic agents], and iodine - food allergy       Review of Systems   Constitutional: Negative for activity change, appetite change, chills, diaphoresis, fatigue, fever and unexpected weight change     HENT: Negative for congestion, dental problem, drooling, ear discharge, ear pain, facial swelling, mouth sores, nosebleeds, postnasal drip, rhinorrhea, trouble swallowing and voice change  Eyes: Negative for photophobia, pain, discharge, redness, itching and visual disturbance  Sees Tri[facility] rutherford dr there, and Dr Nolan Stubbs  Pressure up in L eye and they will erika in 2 wks  Respiratory: Negative for apnea, cough, choking, chest tightness and shortness of breath  [facility] [place] - 2d shot in March, and will get booster when avilable  Cardiovascular: Negative for chest pain and leg swelling  Denies any and all cardiac symptoms   Gastrointestinal: Negative for abdominal distention, abdominal pain, constipation, diarrhea and nausea  Endocrine: Negative for polydipsia, polyphagia and polyuria  Genitourinary: Negative for decreased urine volume, difficulty urinating, dysuria, enuresis and hematuria  Musculoskeletal: Positive for arthralgias, back pain, gait problem and neck pain  Negative for joint swelling  Could C stenosis be partly responsible for his amb dysfunction? Well, not likely  No pain now, just dizzy  Skin: Negative for color change, pallor, rash and wound  Allergic/Immunologic: Negative for immunocompromised state  Neurological: Negative for dizziness, seizures, syncope, facial asymmetry, speech difficulty, light-headedness and headaches  Hematological: Negative for adenopathy  Psychiatric/Behavioral: Positive for confusion  Negative for agitation, behavioral problems and decreased concentration  Objective:      /62   Pulse 55   Temp (!) 96 6 °F (35 9 °C)   Resp 22   Ht 6' 6" (1 981 m)   Wt 84 6 kg (186 lb 9 6 oz)   SpO2 92%   BMI 21 56 kg/m²          Physical Exam  Vitals and nursing note reviewed  Constitutional:       Appearance: Normal appearance  He is well-developed and normal weight  He is not diaphoretic  HENT:      Head: Normocephalic and atraumatic  Nose: Nose normal    Eyes:      Pupils: Pupils are equal, round, and reactive to light     Neck: Trachea: No tracheal deviation  Cardiovascular:      Rate and Rhythm: Normal rate and regular rhythm  Heart sounds: Normal heart sounds  Comments: Follow with Dr Kriss Pike, cardiooogist   For carotid tst Sept 2, 2021  Pulmonary:      Effort: Pulmonary effort is normal       Breath sounds: Normal breath sounds  No wheezing  Abdominal:      General: Bowel sounds are normal       Palpations: Abdomen is soft  Musculoskeletal:         General: Normal range of motion  Cervical back: Normal range of motion and neck supple  Lymphadenopathy:      Cervical: No cervical adenopathy  Skin:     General: Skin is warm and dry  Neurological:      Mental Status: He is alert and oriented to person, place, and time  Psychiatric:         Mood and Affect: Mood normal          Behavior: Behavior normal       Comments: Dementia persists  Getting worse:  Can't recall if ate or not, doesn't know day or night, and is "fixated on garage doors"  Wife, Ave Contreras, runs the entire show now  Pt seen from 1:25 to 2:04 pm  With wife Ave Contreras who is having surgery next week  This time was spent reviewing previous records, reviewing previous laboratory and other tests, taking history from patient, examination of patient, discussion of prognosis and treatment, ordering laboratory tests, ordering medications, and completion of the medical record

## 2022-05-31 ENCOUNTER — HOSPITAL ENCOUNTER (EMERGENCY)
Facility: HOSPITAL | Age: 87
Discharge: HOME | End: 2022-05-31
Attending: EMERGENCY MEDICINE
Payer: MEDICARE

## 2022-05-31 ENCOUNTER — APPOINTMENT (EMERGENCY)
Dept: CT IMAGING | Facility: HOSPITAL | Age: 87
End: 2022-05-31
Payer: MEDICARE

## 2022-05-31 VITALS
OXYGEN SATURATION: 99 % | BODY MASS INDEX: 21.56 KG/M2 | DIASTOLIC BLOOD PRESSURE: 61 MMHG | TEMPERATURE: 98.2 F | HEART RATE: 66 BPM | RESPIRATION RATE: 18 BRPM | SYSTOLIC BLOOD PRESSURE: 111 MMHG | HEIGHT: 78 IN

## 2022-05-31 DIAGNOSIS — T14.8XXA MULTIPLE SKIN TEARS: ICD-10-CM
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
## 2022-05-31 DIAGNOSIS — R93.89 ABNORMAL CT SCAN, NECK: ICD-10-CM

## 2022-05-31 PROCEDURE — 99283 EMERGENCY DEPT VISIT LOW MDM: CPT

## 2022-05-31 PROCEDURE — 70450 CT HEAD/BRAIN W/O DYE: CPT

## 2022-05-31 PROCEDURE — G1004 CDSM NDSC: HCPCS

## 2022-05-31 PROCEDURE — 72125 CT NECK SPINE W/O DYE: CPT

## 2022-05-31 PROCEDURE — 99284 EMERGENCY DEPT VISIT MOD MDM: CPT | Performed by: PHYSICIAN ASSISTANT

## 2022-05-31 NOTE — ED PROVIDER NOTES
History  Chief Complaint   Patient presents with    Leg Injury     Pt reports going to his doctor appt, fell onto L leg, unsure how he fell, did not hit his head, +lac to LLE shin area, bandaged and wrapped pta  Takes baby asa daily  Pt wife states pt missed a step and fell onto his side, skin tear noted on RUE too  The patient is an 72-year-old male with history of hypertension, hyperlipidemia and dementia who presents to the emergency department for evaluation after a fall  Per the wife, the patient tripped and fell  The patient does not remember the event as he has dementia  Patient sustained multiple skin tears of his right arm as well as 1 on his left lower leg  He went to an urgent care where they dressed the wounds, but they ultimately recommended that he come to the ED for CT scan of his head and neck  He states he is not certain if he hit his head or not, but he does not think so  The wife states that she is not sure either, however they did want to have further evaluation  The patient does take a daily low-dose aspirin  The patient denies any further injuries other than the skin tears  Patient was already given a tetanus shot at the urgent care  They deny any further injuries at this time  History provided by:  Patient and significant other   used: No        Prior to Admission Medications   Prescriptions Last Dose Informant Patient Reported? Taking?    Influenza Vac Split High-Dose (FLUZONE HIGH-DOSE) 0 5 ML ORLY   Yes No   Sig: Inject 0 5 mL into the shoulder, thigh, or buttocks once   Magnesium 500 MG CAPS  Self Yes No   Sig: Take by mouth   Omega-3 Fatty Acids (FISH OIL) 1,000 mg   Yes No   Sig: Take 1,200 mg by mouth daily   STARCH-MALTO DEXTRIN (Thick-It) POWD   Yes No   Sig: Take by mouth   acetaminophen (TYLENOL) 500 mg tablet   Yes No   Sig: Take 1,000 mg by mouth every 8 (eight) hours as needed   albuterol (PROVENTIL HFA,VENTOLIN HFA) 90 mcg/act inhaler Yes No   Sig: Inhale 2 puffs every 4 (four) hours as needed for wheezing   aspirin (ECOTRIN LOW STRENGTH) 81 mg EC tablet   Yes No   Sig: Take 81 mg by mouth daily   atenolol (TENORMIN) 25 mg tablet   Yes No   Sig: Take 25 mg by mouth daily   brimonidine (ALPHAGAN P) 0 15 % ophthalmic solution   Yes No   Si drop 3 (three) times a day   brimonidine tartrate 0 2 % ophthalmic solution   Yes No   Sig: INSTILL 1 DROP IN EACH EYE THREE TIMES DAILY   brinzolamide (AZOPT) 1 % ophthalmic suspension   Yes No   Sig: SHAKE LIQUID AND INSTILL 1 DROP IN LEFT EYE THREE TIMES DAILY   calcium citrate-vitamin D (CITRACAL+D) 315-200 MG-UNIT per tablet   Yes No   Sig: Take 1 tablet by mouth daily   cholecalciferol (VITAMIN D3) 1,000 units tablet   Yes No   Sig: Take 1,000 Units by mouth daily   cyanocobalamin (VITAMIN B-12) 1,000 mcg tablet   Yes No   Sig: Take by mouth daily   doxycycline hyclate (VIBRA-TABS) 100 mg tablet   Yes No   Sig: Take 100 mg by mouth 2 (two) times a day with meals   furosemide (LASIX) 20 mg tablet   No No   Sig: TAKE 1 TABLET BY MOUTH EVERY DAY   irbesartan (AVAPRO) 150 mg tablet   Yes No   Sig: Take 150 mg by mouth daily at bedtime   ketorolac (ACULAR) 0 4 % SOLN   Yes No   Si drop daily     latanoprost (XALATAN) 0 005 % ophthalmic solution   Yes No   Si drop daily at bedtime   loteprednol etabonate (LOTEMAX) 0 5 % ophthalmic suspension   Yes No   Sig: INSTILL 1 DROP INTO LEFT EYE EVERY OTHER DAY   mupirocin (BACTROBAN) 2 % ointment   Yes No   Sig: APPLY TO WOUND DAILY WITH EACH DRESSING CHANGE   nitroglycerin (NITROSTAT) 0 4 mg SL tablet   Yes No   Sig: Place 0 4 mg under the tongue   prednisoLONE acetate (PRED FORTE) 1 % ophthalmic suspension   Yes No   Si drop daily   simvastatin (ZOCOR) 40 mg tablet   Yes No   Sig: TAKE 20MG (ONE-HALF TABLET) BY MOUTH QD5P FOR CHOLESTEROL   thiamine (VITAMIN B1) 100 mg tablet   Yes No   Sig: Take 100 mg by mouth daily   traMADol (ULTRAM) 50 mg tablet Yes No   Sig: Take 50 mg by mouth every 6 (six) hours as needed      Facility-Administered Medications: None       Past Medical History:   Diagnosis Date    Cancer ([facility] [address] )     skin; basal cell carcinoma     Heart disease     Hyperlipidemia     Hypertension        Past Surgical History:   Procedure Laterality Date    BASAL CELL CARCINOMA EXCISION  10/11/2019    Removed from back and face    CATARACT EXTRACTION, BILATERAL Bilateral 01/01/2014    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    CORONARY ANGIOPLASTY WITH STENT PLACEMENT  01/01/2007    SKIN CANCER EXCISION Left 04/28/2017    skin cancer removed from left side of face       Family History   Problem Relation Age of Onset    No Known Problems Mother     No Known Problems Father      I have reviewed and agree with the history as documented  E-Cigarette/Vaping    E-Cigarette Use Never User      E-Cigarette/Vaping Substances    Nicotine No     THC No     CBD No     Flavoring No     Other No     Unknown No      Social History     Tobacco Use    Smoking status: Former Smoker     Packs/day: 1 00     Years: 32 00     Pack years: 32 00     Types: Cigarettes    Smokeless tobacco: Never Used    Tobacco comment: smoked age [age], about 1 ppd    Vaping Use    Vaping Use: Never used   Substance Use Topics    Alcohol use: Yes    Drug use: No       Review of Systems   Constitutional: Negative for chills and fever  HENT: Negative for ear pain and sore throat  Eyes: Negative for redness and visual disturbance  Respiratory: Negative for cough, shortness of breath and wheezing  Cardiovascular: Negative for chest pain  Gastrointestinal: Negative for abdominal pain, diarrhea, nausea and vomiting  Genitourinary: Negative for dysuria and hematuria  Musculoskeletal: Negative for back pain, neck pain and neck stiffness  Skin: Positive for wound (Multiple skin tears)  Negative for color change and rash     Neurological: Negative for dizziness, light-headedness and headaches  All other systems reviewed and are negative  Physical Exam  Physical Exam  Constitutional:       Appearance: Normal appearance  HENT:      Head: Normocephalic and atraumatic  No raccoon eyes, Mcguire's sign, abrasion, contusion or laceration  Jaw: There is normal jaw occlusion  Nose: Nose normal    Eyes:      Extraocular Movements: Extraocular movements intact  Conjunctiva/sclera: Conjunctivae normal       Pupils: Pupils are equal, round, and reactive to light  Pulmonary:      Effort: Pulmonary effort is normal  No respiratory distress  Musculoskeletal:         General: Normal range of motion  Cervical back: Normal range of motion  Skin:     General: Skin is warm and dry  Findings: Wound present  Neurological:      General: No focal deficit present  Mental Status: He is alert and oriented to person, place, and time  Mental status is at baseline  Motor: Motor function is intact  Vital Signs  ED Triage Vitals [05/31/22 1519]   Temperature Pulse Respirations Blood Pressure SpO2   98 2 °F (36 8 °C) 66 18 111/61 99 %      Temp Source Heart Rate Source Patient Position - Orthostatic VS BP Location FiO2 (%)   Oral Monitor Sitting Right arm --      Pain Score       5           Vitals:    05/31/22 1519   BP: 111/61   Pulse: 66   Patient Position - Orthostatic VS: Sitting         Visual Acuity  Visual Acuity    Flowsheet Row Most Recent Value   L Pupil Size (mm) 3   R Pupil Size (mm) 3          ED Medications  Medications - No data to display    Diagnostic Studies  Results Reviewed     None                 CT head without contrast   Final Result by Adilson Smith MD (1933)      No acute intracranial abnormality  Workstation performed: [id number]         CT cervical spine without contrast   Final Result by Adilson Smith MD (05/31 1931)         1    No cervical spine fracture or traumatic malalignment  2   Asymmetric thickening of the right vocal cord concerning for malignancy  Direct inspection and CT of the neck with contrast recommended  Workstation performed: [id number]                    Procedures  Procedures         ED Course  ED Course as of 05/31/22 2022   Tue May 31, 2022   1954 Incidental finding of vocal cord thickening concerning for malignancy discussed with patient and his wife  Clarified with Radiology, and this workup can be done outpatient  MDM  Number of Diagnoses or Management Options  Abnormal CT scan, neck: new and requires workup  Fall, initial encounter: new and requires workup  Multiple skin tears: new and requires workup  Diagnosis management comments: Patient presents for evaluation after suffering a mechanical fall earlier today  Patient was previously evaluated at an urgent care  He had multiple skin tears that were Steri-Stripped and dressed at urgent care  I did recheck the wounds, and I agree that repair with sutures is not indicated for any of them due to the thinness of the skin of the skin tear  The skin tear of the left lower leg is the deepest, however the skin flap, again, is extremely thin  I redressed all the wounds, and I reiterated proper wound care to the patient's wife as well as supplied her with some supplies for the next couple of days until she is able to get her own  We also discussed signs of infection to watch out for, and I advised he return immediately with any signs of infection  The primary reason for the patient's visit, as they wanted to get a CT scan of the patient's head as they are uncertain of whether not he struck his head  I do not see any outward signs of trauma, however the patient does have dementia, he is unable to provide good history at this time  CT of head and neck were ordered  CT of head did not show any acute abnormalities      CT of the neck did not show any acute injuries  Radiologist did make note of right vocal cord thickening concerning for malignancy  Further imaging of the neck with IV contrast was recommended, although I did confirm with Radiology that this can be done in an outpatient setting  I discussed these results with the patient's wife  I recommended she call patient's primary care doctor 1st thing tomorrow to schedule follow-up  Additionally, I provided information for ENT for follow-up as well  I instructed the patient return to the ED if he develops any difficulty breathing or inability to swallow  Patient is stable for discharge  Amount and/or Complexity of Data Reviewed  Tests in the radiology section of CPT®: ordered and reviewed  Decide to obtain previous medical records or to obtain history from someone other than the patient: yes  Obtain history from someone other than the patient: yes  Review and summarize past medical records: yes  Discuss the patient with other providers: yes    Risk of Complications, Morbidity, and/or Mortality  Presenting problems: moderate  Diagnostic procedures: moderate  Management options: moderate    Patient Progress  Patient progress: stable      Disposition  Final diagnoses:   Fall, initial encounter   Multiple skin tears   Abnormal CT scan, neck     Time reflects when diagnosis was documented in both MDM as applicable and the Disposition within this note     Time User Action Codes Description Comment    5/31/2022  7:57 PM Collette Mark Add [S12  XMSS] Fall, initial encounter     5/31/2022  7:57 PM Chayo Garnett Add [G61  8XXA] Multiple skin tears     5/31/2022  7:57 PM Collette Mark Add [R93 89] Abnormal CT scan, neck       ED Disposition     ED Disposition   Discharge    Condition   Stable    Date/Time   Tue May 31, 2022  7:57 PM    Comment   Donita Goodell Harling discharge to home/self care                 Follow-up Information     Follow up With Specialties Details Why Contact Info Additional Information    Jaya Graff DO Family Medicine Call in 1 day  [address] [facility] [address]       Konstantin Romero MD Otolaryngology Schedule an appointment as soon as possible for a visit   [address] [facility]  [address] [place] [address] Emergency Department Emergency Medicine  If symptoms worsen [address] [facility] [address] [facility], [address], [place], [place], [address]          Discharge Medication List as of 5/31/2022  7:59 PM      CONTINUE these medications which have NOT CHANGED    Details   acetaminophen (TYLENOL) 500 mg tablet Take 1,000 mg by mouth every 8 (eight) hours as needed, Starting Tue 12/14/2021, Historical Med      albuterol (PROVENTIL HFA,VENTOLIN HFA) 90 mcg/act inhaler Inhale 2 puffs every 4 (four) hours as needed for wheezing, Historical Med      aspirin (ECOTRIN LOW STRENGTH) 81 mg EC tablet Take 81 mg by mouth daily, Historical Med      atenolol (TENORMIN) 25 mg tablet Take 25 mg by mouth daily, Historical Med      brimonidine (ALPHAGAN P) 0 15 % ophthalmic solution 1 drop 3 (three) times a day, Historical Med      brimonidine tartrate 0 2 % ophthalmic solution INSTILL 1 DROP IN EACH EYE THREE TIMES DAILY, Historical Med      brinzolamide (AZOPT) 1 % ophthalmic suspension SHAKE LIQUID AND INSTILL 1 DROP IN LEFT EYE THREE TIMES DAILY, Historical Med      calcium citrate-vitamin D (CITRACAL+D) 315-200 MG-UNIT per tablet Take 1 tablet by mouth daily, Historical Med      cholecalciferol (VITAMIN D3) 1,000 units tablet Take 1,000 Units by mouth daily, Historical Med      cyanocobalamin (VITAMIN B-12) 1,000 mcg tablet Take by mouth daily, Historical Med      doxycycline hyclate (VIBRA-TABS) 100 mg tablet Take 100 mg by mouth 2 (two) times a day with meals, Starting Fri 1/21/2022, Historical Med furosemide (LASIX) 20 mg tablet TAKE 1 TABLET BY MOUTH EVERY DAY, Normal      Influenza Vac Split High-Dose (FLUZONE HIGH-DOSE) 0 5 ML ORLY Inject 0 5 mL into the shoulder, thigh, or buttocks once, Historical Med      irbesartan (AVAPRO) 150 mg tablet Take 150 mg by mouth daily at bedtime, Historical Med      ketorolac (ACULAR) 0 4 % SOLN 1 drop daily  , Historical Med      latanoprost (XALATAN) 0 005 % ophthalmic solution 1 drop daily at bedtime, Historical Med      loteprednol etabonate (LOTEMAX) 0 5 % ophthalmic suspension INSTILL 1 DROP INTO LEFT EYE EVERY OTHER DAY, Historical Med      Magnesium 500 MG CAPS Take by mouth, Historical Med      mupirocin (BACTROBAN) 2 % ointment APPLY TO WOUND DAILY WITH EACH DRESSING CHANGE, Historical Med      nitroglycerin (NITROSTAT) 0 4 mg SL tablet Place 0 4 mg under the tongue, Historical Med      Omega-3 Fatty Acids (FISH OIL) 1,000 mg Take 1,200 mg by mouth daily, Historical Med      prednisoLONE acetate (PRED FORTE) 1 % ophthalmic suspension 1 drop daily, Historical Med      simvastatin (ZOCOR) 40 mg tablet TAKE 20MG (ONE-HALF TABLET) BY MOUTH QD5P FOR CHOLESTEROL, Historical Med      STARCH-MALTO DEXTRIN (Thick-It) POWD Take by mouth, Historical Med      thiamine (VITAMIN B1) 100 mg tablet Take 100 mg by mouth daily, Historical Med      traMADol (ULTRAM) 50 mg tablet Take 50 mg by mouth every 6 (six) hours as needed, Starting Tue 12/14/2021, Historical Med             No discharge procedures on file      PDMP Review     None          ED Provider  Electronically Signed by           Manuel Boykin PA-C  05/31/22 2022

## 2022-05-31 NOTE — DISCHARGE INSTRUCTIONS
You had an abnormal finding on your CT scan of your right vocal cord that will require further follow-up  Please call Dr Tamara Cuello as you will need to be scheduled for further imaging of your throat  Please additionally schedule follow-up with an otolaryngologist (ear, nose, throat doctor)  Please return to the emergency department if you have any difficulty breathing or are unable to swallow

## 2022-06-01 ENCOUNTER — TELEPHONE (OUTPATIENT)
Dept: FAMILY MEDICINE CLINIC | Facility: CLINIC | Age: 87
End: 2022-06-01

## 2022-06-01 NOTE — TELEPHONE ENCOUNTER
Patient's wife called to let you know that he had a fall  Went to [facility] and they did a cat scan, found something on Right vocal cord  Sending him to an Ear nose throat Dr  (Dr Deja Mccarthy on Monday 6/6/22/  Will call you to follow up after that  Just wanted you to be aware

## 2022-06-29 ENCOUNTER — APPOINTMENT (OUTPATIENT)
Dept: LAB | Facility: AMBULARY SURGERY CENTER | Age: 87
End: 2022-06-29
Payer: MEDICARE

## 2022-06-29 DIAGNOSIS — Z79.899 ENCOUNTER FOR LONG-TERM (CURRENT) USE OF MEDICATIONS: ICD-10-CM

## 2022-06-29 DIAGNOSIS — I10 ESSENTIAL HYPERTENSION: ICD-10-CM

## 2022-06-29 DIAGNOSIS — N18.32 STAGE 3B CHRONIC KIDNEY DISEASE (HCC): ICD-10-CM

## 2022-06-29 LAB
ALBUMIN SERPL BCP-MCNC: 3.5 G/DL (ref 3.5–5)
ALP SERPL-CCNC: 61 U/L (ref 46–116)
ALT SERPL W P-5'-P-CCNC: 20 U/L (ref 12–78)
ANION GAP SERPL CALCULATED.3IONS-SCNC: 3 MMOL/L (ref 4–13)
AST SERPL W P-5'-P-CCNC: 19 U/L (ref 5–45)
BASOPHILS # BLD AUTO: 0.08 THOUSANDS/ΜL (ref 0–0.1)
BASOPHILS NFR BLD AUTO: 2 % (ref 0–1)
BILIRUB SERPL-MCNC: 1.72 MG/DL (ref 0.2–1)
BUN SERPL-MCNC: 30 MG/DL (ref 5–25)
CALCIUM SERPL-MCNC: 9 MG/DL (ref 8.3–10.1)
CHLORIDE SERPL-SCNC: 112 MMOL/L (ref 100–108)
CO2 SERPL-SCNC: 27 MMOL/L (ref 21–32)
CREAT SERPL-MCNC: 1.49 MG/DL (ref 0.6–1.3)
EOSINOPHIL # BLD AUTO: 0.14 THOUSAND/ΜL (ref 0–0.61)
EOSINOPHIL NFR BLD AUTO: 3 % (ref 0–6)
ERYTHROCYTE [DISTWIDTH] IN BLOOD BY AUTOMATED COUNT: 13.2 % (ref 11.6–15.1)
GFR SERPL CREATININE-BSD FRML MDRD: 41 ML/MIN/1.73SQ M
GLUCOSE P FAST SERPL-MCNC: 99 MG/DL (ref 65–99)
HCT VFR BLD AUTO: 38.7 % (ref 36.5–49.3)
HGB BLD-MCNC: 12 G/DL (ref 12–17)
IMM GRANULOCYTES # BLD AUTO: 0.01 THOUSAND/UL (ref 0–0.2)
IMM GRANULOCYTES NFR BLD AUTO: 0 % (ref 0–2)
LYMPHOCYTES # BLD AUTO: 0.97 THOUSANDS/ΜL (ref 0.6–4.47)
LYMPHOCYTES NFR BLD AUTO: 22 % (ref 14–44)
MCH RBC QN AUTO: 30.4 PG (ref 26.8–34.3)
MCHC RBC AUTO-ENTMCNC: 31 G/DL (ref 31.4–37.4)
MCV RBC AUTO: 98 FL (ref 82–98)
MONOCYTES # BLD AUTO: 0.43 THOUSAND/ΜL (ref 0.17–1.22)
MONOCYTES NFR BLD AUTO: 10 % (ref 4–12)
NEUTROPHILS # BLD AUTO: 2.82 THOUSANDS/ΜL (ref 1.85–7.62)
NEUTS SEG NFR BLD AUTO: 63 % (ref 43–75)
NRBC BLD AUTO-RTO: 0 /100 WBCS
PLATELET # BLD AUTO: 137 THOUSANDS/UL (ref 149–390)
PMV BLD AUTO: 11.4 FL (ref 8.9–12.7)
POTASSIUM SERPL-SCNC: 4.2 MMOL/L (ref 3.5–5.3)
PROT SERPL-MCNC: 6.5 G/DL (ref 6.4–8.2)
RBC # BLD AUTO: 3.95 MILLION/UL (ref 3.88–5.62)
SODIUM SERPL-SCNC: 142 MMOL/L (ref 136–145)
WBC # BLD AUTO: 4.45 THOUSAND/UL (ref 4.31–10.16)

## 2022-06-29 PROCEDURE — 85025 COMPLETE CBC W/AUTO DIFF WBC: CPT

## 2022-06-29 PROCEDURE — 36415 COLL VENOUS BLD VENIPUNCTURE: CPT

## 2022-06-29 PROCEDURE — 80053 COMPREHEN METABOLIC PANEL: CPT

## 2022-06-30 ENCOUNTER — OFFICE VISIT (OUTPATIENT)
Dept: FAMILY MEDICINE CLINIC | Facility: CLINIC | Age: 87
End: 2022-06-30
Payer: MEDICARE

## 2022-06-30 VITALS
TEMPERATURE: 97.4 F | DIASTOLIC BLOOD PRESSURE: 60 MMHG | RESPIRATION RATE: 18 BRPM | SYSTOLIC BLOOD PRESSURE: 110 MMHG | OXYGEN SATURATION: 97 % | WEIGHT: 182.4 LBS | HEIGHT: 78 IN | HEART RATE: 62 BPM | BODY MASS INDEX: 21.1 KG/M2

## 2022-06-30 DIAGNOSIS — L03.115 CELLULITIS OF RIGHT LOWER EXTREMITY: ICD-10-CM

## 2022-06-30 DIAGNOSIS — T14.8XXA OPEN WOUND: Primary | ICD-10-CM

## 2022-06-30 DIAGNOSIS — G61.81 CHRONIC INFLAMMATORY DEMYELINATING POLYNEUROPATHY (HCC): ICD-10-CM

## 2022-06-30 DIAGNOSIS — R60.1 GENERALIZED EDEMA: ICD-10-CM

## 2022-06-30 PROBLEM — M48.061 SPINAL STENOSIS OF LUMBAR REGION: Status: ACTIVE | Noted: 2022-06-30

## 2022-06-30 PROBLEM — E55.9 VITAMIN D DEFICIENCY: Status: ACTIVE | Noted: 2022-06-30

## 2022-06-30 PROBLEM — F03.90 DEMENTIA (HCC): Status: ACTIVE | Noted: 2022-06-30

## 2022-06-30 PROBLEM — C44.320 SQUAMOUS CELL CARCINOMA OF FACE: Status: ACTIVE | Noted: 2017-04-14

## 2022-06-30 PROCEDURE — 99214 OFFICE O/P EST MOD 30 MIN: CPT | Performed by: NURSE PRACTITIONER

## 2022-06-30 RX ORDER — CIPROFLOXACIN 500 MG/1
500 TABLET, FILM COATED ORAL EVERY 12 HOURS SCHEDULED
Qty: 10 TABLET | Refills: 0 | Status: SHIPPED | OUTPATIENT
Start: 2022-06-30 | End: 2022-07-05

## 2022-06-30 RX ORDER — CIPROFLOXACIN 500 MG/1
TABLET, FILM COATED ORAL
COMMUNITY
Start: 2022-06-23 | End: 2022-06-30 | Stop reason: SDUPTHER

## 2022-06-30 NOTE — Clinical Note
Please follow up and see how is the wound doing and if not better or further issues make sure he has follow up with Dr Liz Burgos or Deion please

## 2022-06-30 NOTE — PROGRESS NOTES
Depression Screening and Follow-up Plan: Patient was screened for depression during today's encounter  They screened negative with a PHQ-2 score of 0  Falls Plan of Care: balance, strength, and gait training instructions were provided  Recommended assistive device to help with gait and balance  Medications that increase falls were reviewed  Assessed feet and footwear  Vitamin D supplementation was recommended  Home safety education provided  Assessment/Plan:    Pt of Dr Elisa Holliday in office for follow up non healing lower extremity abrasion to  Right lower extremity  It does appear as there may some circulatory  issues   Will order an arterial duplex   Will need follow up with Dr Luz Wilkinson in 2 weeks      Problem List Items Addressed This Visit        Nervous and Auditory    Chronic inflammatory demyelinating polyneuropathy ([facility] [address] )      Other Visit Diagnoses     Open wound    -  Primary    Relevant Medications    ciprofloxacin (CIPRO) 500 mg tablet    Other Relevant Orders    VAS lower limb arterial duplex, complete bilateral    Generalized edema        Relevant Orders    VAS lower limb arterial duplex, complete bilateral    Cellulitis of right lower extremity                Subjective:      Patient ID: Jas Funez is a 80 y o  male  HPI    The following portions of the patient's history were reviewed and updated as appropriate:   Past Medical History:  He has a [facility] medical history of [facility] ), Fatigue, Heart disease, Hyperlipidemia, and Hypertension  ,  _______________________________________________________________________  Medical Problems:  does not have any pertinent problems on file ,  _______________________________________________________________________  Past Surgical History:   has a past surgical history that includes Excision basal cell carcinoma (10/11/2019); Coronary angioplasty with stent (01/01/2007); Cataract extraction, bilateral (Bilateral, 01/01/2014);  Corneal transplant (Left, 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); Skin cancer excision (Left, 04/28/2017); and Tonsillectomy  ,  _______________________________________________________________________  Family History:  family history includes No Known Problems in his father and mother ,  _______________________________________________________________________  Social History:   reports that he has quit smoking  His smoking use included cigarettes  He has a 32 00 pack-year smoking history  He has never used smokeless tobacco  He reports current alcohol use  He reports that he does not use drugs  ,  _______________________________________________________________________  Allergies:  is allergic to gadolinium, contrast  [iodinated diagnostic agents], and iodine - food allergy     _______________________________________________________________________  Current Outpatient Medications   Medication Sig Dispense Refill    ciprofloxacin (CIPRO) 500 mg tablet Take 1 tablet (500 mg total) by mouth every 12 (twelve) hours for 5 days 10 tablet 0    acetaminophen (TYLENOL) 500 mg tablet Take 1,000 mg by mouth every 8 (eight) hours as needed      albuterol (PROVENTIL HFA,VENTOLIN HFA) 90 mcg/act inhaler Inhale 2 puffs every 4 (four) hours as needed for wheezing      aspirin (ECOTRIN LOW STRENGTH) 81 mg EC tablet Take 81 mg by mouth daily      atenolol (TENORMIN) 25 mg tablet Take 25 mg by mouth daily      brimonidine (ALPHAGAN P) 0 15 % ophthalmic solution 1 drop 3 (three) times a day      brimonidine tartrate 0 2 % ophthalmic solution INSTILL 1 DROP IN EACH EYE THREE TIMES DAILY      brinzolamide (AZOPT) 1 % ophthalmic suspension SHAKE LIQUID AND INSTILL 1 DROP IN LEFT EYE THREE TIMES DAILY      calcium citrate-vitamin D (CITRACAL+D) 315-200 MG-UNIT per tablet Take 1 tablet by mouth daily      cholecalciferol (VITAMIN D3) 1,000 units tablet Take 1,000 Units by mouth daily      cyanocobalamin (VITAMIN B-12) 1,000 mcg tablet Take by mouth daily      doxycycline hyclate (VIBRA-TABS) 100 mg tablet Take 100 mg by mouth 2 (two) times a day with meals      furosemide (LASIX) 20 mg tablet TAKE 1 TABLET BY MOUTH EVERY DAY 30 tablet 6    influenza vaccine, high-dose (FLUZONE HIGH-DOSE) 0 5 ML ORLY Inject 0 5 mL into the shoulder, thigh, or buttocks once      irbesartan (AVAPRO) 150 mg tablet Take 150 mg by mouth daily at bedtime      ketorolac (ACULAR) 0 4 % SOLN 1 drop daily        latanoprost (XALATAN) 0 005 % ophthalmic solution 1 drop daily at bedtime      loteprednol etabonate (LOTEMAX) 0 5 % ophthalmic suspension INSTILL 1 DROP INTO LEFT EYE EVERY OTHER DAY      Magnesium 500 MG CAPS Take by mouth      mupirocin (BACTROBAN) 2 % ointment APPLY TO WOUND DAILY WITH EACH DRESSING CHANGE      nitroglycerin (NITROSTAT) 0 4 mg SL tablet Place 0 4 mg under the tongue      Omega-3 Fatty Acids (FISH OIL) 1,000 mg Take 1,200 mg by mouth daily      prednisoLONE acetate (PRED FORTE) 1 % ophthalmic suspension 1 drop daily      simvastatin (ZOCOR) 40 mg tablet TAKE 20MG (ONE-HALF TABLET) BY MOUTH QD5P FOR CHOLESTEROL      STARCH-MALTO DEXTRIN (Thick-It) POWD Take by mouth      thiamine (VITAMIN B1) 100 mg tablet Take 100 mg by mouth daily      traMADol (ULTRAM) 50 mg tablet Take 50 mg by mouth every 6 (six) hours as needed       No current facility-administered medications for this visit      _______________________________________________________________________  Review of Systems   Constitutional: Negative for activity change, appetite change, chills, diaphoresis, fatigue, fever and unexpected weight change  HENT: Negative for congestion, dental problem, drooling, ear discharge, ear pain, facial swelling, mouth sores, nosebleeds, postnasal drip, rhinorrhea, trouble swallowing and voice change  Eyes: Negative for photophobia, pain, discharge, redness, itching and visual disturbance          Sees Marissa rutherford dr there, and Dr Marcos Trevizo  Pressure up in L eye and they will erika in 2 wks  Respiratory: Negative for apnea, cough, choking, chest tightness and shortness of breath  [facility] [place] [facility] 2d shot in March, and will get booster when avilable  Cardiovascular: Negative for chest pain and leg swelling  Denies any and all cardiac symptoms   Gastrointestinal: Negative for abdominal distention, abdominal pain, constipation, diarrhea and nausea  Endocrine: Negative for polydipsia, polyphagia and polyuria  Genitourinary: Negative for decreased urine volume, difficulty urinating, dysuria, enuresis and hematuria  Musculoskeletal: Positive for arthralgias, back pain, gait problem and neck pain  Negative for joint swelling  Could C stenosis be partly responsible for his amb dysfunction? Well, not likely  No pain now, just dizzy  Skin: Positive for wound  Negative for color change, pallor and rash  Right lower extremity cellulitis and abrasion    Allergic/Immunologic: Negative for immunocompromised state  Neurological: Negative for dizziness, seizures, syncope, facial asymmetry, speech difficulty, light-headedness and headaches  Hematological: Negative for adenopathy  Psychiatric/Behavioral: Positive for confusion  Negative for agitation, behavioral problems and decreased concentration  Objective:  Vitals:    06/30/22 0926   BP: 110/60   Pulse: 62   Resp: 18   Temp: (!) 97 4 °F (36 3 °C)   SpO2: 97%   Weight: 82 7 kg (182 lb 6 4 oz)   Height: 6' 6" (1 981 m)     Body mass index is 21 08 kg/m²  Physical Exam  Vitals and nursing note reviewed  Constitutional:       Appearance: Normal appearance  He is well-developed and normal weight  He is not diaphoretic  HENT:      Head: Normocephalic and atraumatic  Nose: Nose normal    Eyes:      Pupils: Pupils are equal, round, and reactive to light  Neck:      Trachea: No tracheal deviation     Cardiovascular:      Rate and Rhythm: Normal rate and regular rhythm  Heart sounds: Normal heart sounds  Comments: Follow with Dr Russ Gomez, cardiooogist   For carotid tst Sept 2, 2021  Pulmonary:      Effort: Pulmonary effort is normal       Breath sounds: Normal breath sounds  No wheezing  Abdominal:      General: Bowel sounds are normal       Palpations: Abdomen is soft  Musculoskeletal:         General: Signs of injury present  Cervical back: Normal range of motion and neck supple  Right lower leg: Edema present  Comments: RLE cellulitis and open abrasion    Lymphadenopathy:      Cervical: No cervical adenopathy  Skin:     General: Skin is warm  Findings: Erythema present  Comments: RLE WOUND - non healing abrasion    Neurological:      Mental Status: He is alert  Comments: Underlying dementia    Psychiatric:         Mood and Affect: Mood normal          Behavior: Behavior normal       Comments: Dementia persists  Getting worse:  Can't recall if ate or not, doesn't know day or night, and is "fixated on garage doors"  Wife, Ayanna Zelaya, runs the entire show now

## 2022-07-05 ENCOUNTER — HOSPITAL ENCOUNTER (OUTPATIENT)
Dept: NON INVASIVE DIAGNOSTICS | Facility: CLINIC | Age: 87
Discharge: HOME | End: 2022-07-05
Payer: MEDICARE

## 2022-07-05 DIAGNOSIS — T14.8XXA OPEN WOUND: ICD-10-CM

## 2022-07-05 DIAGNOSIS — R60.1 GENERALIZED EDEMA: ICD-10-CM

## 2022-07-05 PROCEDURE — 93923 UPR/LXTR ART STDY 3+ LVLS: CPT

## 2022-07-05 PROCEDURE — 93925 LOWER EXTREMITY STUDY: CPT

## 2022-07-05 PROCEDURE — 93925 LOWER EXTREMITY STUDY: CPT | Performed by: SURGERY

## 2022-07-05 PROCEDURE — 93922 UPR/L XTREMITY ART 2 LEVELS: CPT | Performed by: SURGERY

## 2022-07-20 ENCOUNTER — TELEPHONE (OUTPATIENT)
Dept: FAMILY MEDICINE CLINIC | Facility: CLINIC | Age: 87
End: 2022-07-20

## 2022-07-20 ENCOUNTER — ANESTHESIA EVENT (OUTPATIENT)
Dept: PERIOP | Facility: HOSPITAL | Age: 87
End: 2022-07-20
Payer: MEDICARE

## 2022-07-20 NOTE — TELEPHONE ENCOUNTER
Wife called she said the patient is having a biopsy of his vocal cords on the 29th  She was told they dont need anything and that the doctor would be giving dr rae a call -  She was just checking if he got the call?

## 2022-07-21 NOTE — TELEPHONE ENCOUNTER
The way she talked it seemed he was going to call you in advance to discuss the patient she was just checking

## 2022-07-22 NOTE — H&P (VIEW-ONLY)
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           Subjective:      Patient ID: Maia Garrido is a 80 y o  male  Patient is here for a pre op appointment for a Microlaryngoscopy and Biopsy  Patient complains of slight throat pain but denies having any difficulty swallowing or breathing  [facility] was [facility]; he had a fall, and on his C-spine CT they incidentally found "thickening of the right vocal cord"  The following portions of the patient's history were reviewed and updated as appropriate: allergies, current medications, past family history, past medical history, past social history, past surgical history and problem list     Review of Systems      Objective:      Ht 6' 6" (1 981 m)   Wt 82 7 kg (182 lb 5 1 oz)   BMI 21 07 kg/m²          Physical Exam  Constitutional:       Appearance: He is well-developed  HENT:      Head: Normocephalic and atraumatic  Right Ear: Tympanic membrane, ear canal and external ear normal  No drainage  No middle ear effusion  Left Ear: Tympanic membrane, ear canal and external ear normal  No drainage  No middle ear effusion  Nose: Septal deviation present  Mouth/Throat:      Pharynx: Uvula midline  No oropharyngeal exudate  Tonsils: 0 on the right  0 on the left  Neck:      Thyroid: No thyroid mass or thyromegaly  Trachea: Trachea normal  No tracheal deviation  Cardiovascular:      Heart sounds: Normal heart sounds, S1 normal and S2 normal    Pulmonary:      Breath sounds: Normal breath sounds and air entry  Abdominal:      General: Bowel sounds are normal       Palpations: Abdomen is soft  Lymphadenopathy:      Cervical: No cervical adenopathy  Neurological:      Mental Status: He is alert           Procedure: Strobovideolaryngoscopy (90660)  Dynamic Voice Analysis ([id number])    Pre-Operative Diagnosis:  1  Dysphonia    Post-Operative Diagnosis:    1  Dysphonia   Surgeon: Arlene Latham MD  Anesthesia: lidocaine 4% + oxymetazoline     Stroboscope: EndoDigi  Flexible nasolaryngocsope, distal chip  Procedure Details: The procedure was performed in the endoscopy special procedures room  A high resolution video laryngoscope was inserted transnasally / transorally and monitored on the video system for magnification and documentation  Stroboscopic light at several frequencies and intensities was used  Videostrobolaryngoscopic findings are deferred to the following report because the separate procedure with rigid endoscopy produces much higher resolution and allows diagnosis of lesions not visible during flexible laryngoscopy and dynamic voice analysis  Voice:      Supraglottic Hyperfunction: none  Arytenoid Joint Movement: absent on right  Dysdiadochokinesis: Absent  Arytenoids: nl  Posterior Cobblestoning: none  Right Vocal Fold:  Abduction:    absent  Adduction:    absent  Longitudinal Tension:   decreased  Left Vocal Fold:  Abduction:    normal  Adduction:    normal  Longitudinal Tension:   normal    Videostrobolaryngoscopy with Magnification  Amplitude Symmetry:   Symmetric  Phase Symmetry:    Symmetric  Periodicity:    Regular  Glottic Closure:    incomplete, mainly due to bowing  Vocal Process Height: normal  Post-Cricoid Edema: none  Post-Cricoid Pachydermia: none  Amplitude of Vocal Folds:  Right: decreased  Left: normal  Wave Form of Vocal Folds:  Right: decreased  Left: normal  Vibratory Function of Vocal Folds:  Right: decreased  Left: normal  Vocal Fold Color:  Right: increased  Left: normal  Masses/Vibratory Margin Irregularities: fullness and hyperemia in area of right vocal process  Other Lesions: none  The procedure was concluded without complication and the laryngoscope was removed

## 2022-07-25 NOTE — PRE-PROCEDURE INSTRUCTIONS
Pre-Surgery Instructions:   Medication Instructions    acetaminophen (TYLENOL) 500 mg tablet Uses PRN- OK to take day of surgery    albuterol (PROVENTIL HFA,VENTOLIN HFA) 90 mcg/act inhaler Uses PRN- OK to take day of surgery    aspirin (ECOTRIN LOW STRENGTH) 81 mg EC tablet 7/26 wife called and was instructed per surgeon LD 7/25 and than hold for surgery-she understood    atenolol (TENORMIN) 25 mg tablet Take day of surgery  7/26 wife called and pt only can take with applesauce-will give him once he is home after surgery    brimonidine (ALPHAGAN P) 0 15 % ophthalmic solution Take day of surgery   brinzolamide (AZOPT) 1 % ophthalmic suspension Take day of surgery   calcium citrate-vitamin D (CITRACAL+D) 315-200 MG-UNIT per tablet Stop taking 7 days prior to surgery   cholecalciferol (VITAMIN D3) 1,000 units tablet Stop taking 7 days prior to surgery   cyanocobalamin (VITAMIN B-12) 1,000 mcg tablet Stop taking 7 days prior to surgery   furosemide (LASIX) 20 mg tablet Hold day of surgery   irbesartan (AVAPRO) 150 mg tablet Hold day of surgery   latanoprost (XALATAN) 0 005 % ophthalmic solution Take night before surgery    Magnesium 500 MG CAPS Stop taking 7 days prior to surgery   mupirocin (BACTROBAN) 2 % ointment Uses PRN- DO NOT take day of surgery    nitroglycerin (NITROSTAT) 0 4 mg SL tablet Uses PRN- OK to take day of surgery    Omega-3 Fatty Acids (FISH OIL) 1,000 mg Stop taking 7 days prior to surgery   prednisoLONE acetate (PRED FORTE) 1 % ophthalmic suspension Take day of surgery   simvastatin (ZOCOR) 40 mg tablet Take night before surgery    thiamine (VITAMIN B1) 100 mg tablet Stop taking 7 days prior to surgery  Per pt;s wife-- pt took LD vitamins 7/25 and now on hold for surgery    Patient instructed to stop , NSAIDS, vitamins and herbal supplements one week prior to surgery or per Dr Rowland Poag

## 2022-07-28 ENCOUNTER — TELEPHONE (OUTPATIENT)
Dept: FAMILY MEDICINE CLINIC | Facility: CLINIC | Age: 87
End: 2022-07-28

## 2022-07-29 ENCOUNTER — HOSPITAL ENCOUNTER (OUTPATIENT)
Facility: HOSPITAL | Age: 87
Setting detail: OUTPATIENT SURGERY
Discharge: HOME | End: 2022-07-29
Attending: OTOLARYNGOLOGY | Admitting: OTOLARYNGOLOGY
Payer: MEDICARE

## 2022-07-29 ENCOUNTER — ANESTHESIA (OUTPATIENT)
Dept: PERIOP | Facility: HOSPITAL | Age: 87
End: 2022-07-29
Payer: MEDICARE

## 2022-07-29 VITALS
HEART RATE: 52 BPM | SYSTOLIC BLOOD PRESSURE: 160 MMHG | BODY MASS INDEX: 21.98 KG/M2 | HEIGHT: 78 IN | TEMPERATURE: 97.8 F | WEIGHT: 190 LBS | RESPIRATION RATE: 19 BRPM | OXYGEN SATURATION: 99 % | DIASTOLIC BLOOD PRESSURE: 72 MMHG

## 2022-07-29 DIAGNOSIS — D38.0 NEOPLASM OF UNCERTAIN BEHAVIOR OF VOCAL CORD: ICD-10-CM

## 2022-07-29 PROCEDURE — 88305 TISSUE EXAM BY PATHOLOGIST: CPT | Performed by: PATHOLOGY

## 2022-07-29 PROCEDURE — 31536 LARYNGOSCOPY W/BX & OP SCOPE: CPT | Performed by: OTOLARYNGOLOGY

## 2022-07-29 RX ORDER — EPINEPHRINE NASAL SOLUTION 1 MG/ML
SOLUTION NASAL AS NEEDED
Status: DISCONTINUED | OUTPATIENT
Start: 2022-07-29 | End: 2022-07-29 | Stop reason: HOSPADM

## 2022-07-29 RX ORDER — ROCURONIUM BROMIDE 10 MG/ML
INJECTION, SOLUTION INTRAVENOUS AS NEEDED
Status: DISCONTINUED | OUTPATIENT
Start: 2022-07-29 | End: 2022-07-29

## 2022-07-29 RX ORDER — ONDANSETRON 2 MG/ML
INJECTION INTRAMUSCULAR; INTRAVENOUS AS NEEDED
Status: DISCONTINUED | OUTPATIENT
Start: 2022-07-29 | End: 2022-07-29

## 2022-07-29 RX ORDER — PROPOFOL 10 MG/ML
INJECTION, EMULSION INTRAVENOUS AS NEEDED
Status: DISCONTINUED | OUTPATIENT
Start: 2022-07-29 | End: 2022-07-29

## 2022-07-29 RX ORDER — LIDOCAINE HYDROCHLORIDE 10 MG/ML
INJECTION, SOLUTION EPIDURAL; INFILTRATION; INTRACAUDAL; PERINEURAL AS NEEDED
Status: DISCONTINUED | OUTPATIENT
Start: 2022-07-29 | End: 2022-07-29

## 2022-07-29 RX ORDER — DEXAMETHASONE SODIUM PHOSPHATE 10 MG/ML
INJECTION, SOLUTION INTRAMUSCULAR; INTRAVENOUS AS NEEDED
Status: DISCONTINUED | OUTPATIENT
Start: 2022-07-29 | End: 2022-07-29

## 2022-07-29 RX ORDER — EPINEPHRINE 1 MG/ML
INJECTION, SOLUTION, CONCENTRATE INTRAVENOUS AS NEEDED
Status: DISCONTINUED | OUTPATIENT
Start: 2022-07-29 | End: 2022-07-29 | Stop reason: HOSPADM

## 2022-07-29 RX ORDER — FENTANYL CITRATE/PF 50 MCG/ML
25 SYRINGE (ML) INJECTION
Status: DISCONTINUED | OUTPATIENT
Start: 2022-07-29 | End: 2022-07-29 | Stop reason: HOSPADM

## 2022-07-29 RX ORDER — SODIUM CHLORIDE, SODIUM LACTATE, POTASSIUM CHLORIDE, CALCIUM CHLORIDE 600; 310; 30; 20 MG/100ML; MG/100ML; MG/100ML; MG/100ML
INJECTION, SOLUTION INTRAVENOUS CONTINUOUS PRN
Status: DISCONTINUED | OUTPATIENT
Start: 2022-07-29 | End: 2022-07-29

## 2022-07-29 RX ORDER — EPHEDRINE SULFATE 50 MG/ML
INJECTION INTRAVENOUS AS NEEDED
Status: DISCONTINUED | OUTPATIENT
Start: 2022-07-29 | End: 2022-07-29

## 2022-07-29 RX ORDER — FENTANYL CITRATE 50 UG/ML
INJECTION, SOLUTION INTRAMUSCULAR; INTRAVENOUS AS NEEDED
Status: DISCONTINUED | OUTPATIENT
Start: 2022-07-29 | End: 2022-07-29

## 2022-07-29 RX ORDER — ONDANSETRON 2 MG/ML
4 INJECTION INTRAMUSCULAR; INTRAVENOUS ONCE AS NEEDED
Status: DISCONTINUED | OUTPATIENT
Start: 2022-07-29 | End: 2022-07-29 | Stop reason: HOSPADM

## 2022-07-29 RX ORDER — ACETAMINOPHEN 325 MG/1
650 TABLET ORAL EVERY 6 HOURS PRN
Status: DISCONTINUED | OUTPATIENT
Start: 2022-07-29 | End: 2022-07-29 | Stop reason: HOSPADM

## 2022-07-29 RX ORDER — NEOSTIGMINE METHYLSULFATE 1 MG/ML
INJECTION INTRAVENOUS AS NEEDED
Status: DISCONTINUED | OUTPATIENT
Start: 2022-07-29 | End: 2022-07-29

## 2022-07-29 RX ORDER — SODIUM CHLORIDE, SODIUM LACTATE, POTASSIUM CHLORIDE, CALCIUM CHLORIDE 600; 310; 30; 20 MG/100ML; MG/100ML; MG/100ML; MG/100ML
100 INJECTION, SOLUTION INTRAVENOUS CONTINUOUS
Status: DISCONTINUED | OUTPATIENT
Start: 2022-07-29 | End: 2022-07-29 | Stop reason: HOSPADM

## 2022-07-29 RX ORDER — GLYCOPYRROLATE 0.2 MG/ML
INJECTION INTRAMUSCULAR; INTRAVENOUS AS NEEDED
Status: DISCONTINUED | OUTPATIENT
Start: 2022-07-29 | End: 2022-07-29

## 2022-07-29 RX ORDER — DIPHENHYDRAMINE HYDROCHLORIDE 50 MG/ML
12.5 INJECTION INTRAMUSCULAR; INTRAVENOUS ONCE AS NEEDED
Status: DISCONTINUED | OUTPATIENT
Start: 2022-07-29 | End: 2022-07-29 | Stop reason: HOSPADM

## 2022-07-29 RX ADMIN — ROCURONIUM BROMIDE 40 MG: 10 INJECTION, SOLUTION INTRAVENOUS at 12:37

## 2022-07-29 RX ADMIN — GLYCOPYRROLATE 0.6 MCG: 0.2 INJECTION, SOLUTION INTRAMUSCULAR; INTRAVENOUS at 13:22

## 2022-07-29 RX ADMIN — LIDOCAINE HYDROCHLORIDE 50 MG: 10 INJECTION, SOLUTION EPIDURAL; INFILTRATION; INTRACAUDAL; PERINEURAL at 12:37

## 2022-07-29 RX ADMIN — DEXAMETHASONE SODIUM PHOSPHATE 10 MG: 10 INJECTION, SOLUTION INTRAMUSCULAR; INTRAVENOUS at 12:37

## 2022-07-29 RX ADMIN — PROPOFOL 50 MG: 10 INJECTION, EMULSION INTRAVENOUS at 13:09

## 2022-07-29 RX ADMIN — ACETAMINOPHEN 650 MG: 325 TABLET ORAL at 14:45

## 2022-07-29 RX ADMIN — SODIUM CHLORIDE, SODIUM LACTATE, POTASSIUM CHLORIDE, AND CALCIUM CHLORIDE: .6; .31; .03; .02 INJECTION, SOLUTION INTRAVENOUS at 12:00

## 2022-07-29 RX ADMIN — NEOSTIGMINE METHYLSULFATE 3 MG: 1 INJECTION INTRAVENOUS at 13:22

## 2022-07-29 RX ADMIN — ONDANSETRON 4 MG: 2 INJECTION INTRAMUSCULAR; INTRAVENOUS at 12:37

## 2022-07-29 RX ADMIN — GLYCOPYRROLATE 0.2 MCG: 0.2 INJECTION, SOLUTION INTRAMUSCULAR; INTRAVENOUS at 13:26

## 2022-07-29 RX ADMIN — FENTANYL CITRATE 50 MCG: 50 INJECTION INTRAMUSCULAR; INTRAVENOUS at 13:09

## 2022-07-29 RX ADMIN — PROPOFOL 100 MG: 10 INJECTION, EMULSION INTRAVENOUS at 12:37

## 2022-07-29 RX ADMIN — FENTANYL CITRATE 50 MCG: 50 INJECTION INTRAMUSCULAR; INTRAVENOUS at 12:37

## 2022-07-29 RX ADMIN — NEOSTIGMINE METHYLSULFATE 1 MG: 1 INJECTION INTRAVENOUS at 13:26

## 2022-07-29 RX ADMIN — EPHEDRINE SULFATE 5 MG: 50 INJECTION, SOLUTION INTRAVENOUS at 12:55

## 2022-07-29 NOTE — OP NOTE
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]    Procedure(s) (LRB):  MICORLARYNGOSCOPY & BIOPSY  (Right)    Specimen(s):  ID Type Source Tests Collected by Time Destination   1 : RIGHT POSTERIOR VOCAL CORD Tissue Vocal cord TISSUE EXAM Joseph Frias MD 2022 1304    2 : RIGHT POSTERIOR FALSE VOCAL CORD Tissue Vocal cord TISSUE EXAM Joseph Frias MD 2022 1318        Estimated Blood Loss:   Minimal    Drains:  * No LDAs found *    Anesthesia Type:   General    Operative Indications:  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]  Right vocal fold paralysis    Operative Findings:  Fullness and hyperemia in the area of the posterior right vocal fold at the vocal process of the arytenoid, and the posterior right false vocal fold     Complications:   None    Procedure and Technique:  After induction of general endotracheal anesthesia with a # 5 MLT endotracheal tube, the patient was draped in the sterile fashion  A wet gauze was used to protect the maxillary alveolar ridge  The Sataloff medium male direct laryngoscope was inserted, and placed in the suspension apparatus  The operating microscope was positioned to provide a magnified view of the larynx  The right vocal fold had areas of fullness, induration, and hyperemia on the posterior aspect at the junction with the vocal process of the arytenoid, and in the posterior aspect of the right false vocal fold  The areas were injected with epinephrine 1:1,000  Micro scissors and micro graspers were used to take biopsies from both areas    The underlying tissue appeared pale and abnormal   Bleeding was controlled with adrenaline pledgets  At the end of the procedure, the patient was released from suspension, and the laryngoscope and gauze were removed  The oral cavity and pharynx were examined for any injuries, and no injuries were seen  When the patient awoke from general anesthesia, the patient was extubated and transported to the recovery room in satisfactory condition       I was present for the entire procedure    Patient Disposition:  PACU       SIGNATURE: Lorie Farrell MD  DATE: July 29, 2022  TIME: 1:28 PM

## 2022-07-29 NOTE — ANESTHESIA PREPROCEDURE EVALUATION
Procedure:  MICORLARYNGOSCOPY & BIOPSY *OMNIGUIDE LASER (Right Throat)    Relevant Problems   ANESTHESIA (within normal limits)      CARDIO   (+) Coronary artery disease   (+) Essential hypertension   (+) Mixed hyperlipidemia   (+) PAD (peripheral artery disease) (HCC)      ENDO   (-) Diabetes mellitus, type 2 (HCC)   (-) Hyperthyroidism   (-) Hypothyroidism      GI/HEPATIC   (-) Gastroesophageal reflux disease      /RENAL (within normal limits)      HEMATOLOGY   (+) Platelets decreased (HCC)      MUSCULOSKELETAL (within normal limits)      NEURO/PSYCH   (+) Dementia (HCC)   (+) Late onset Alzheimer'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 (HCC)      PULMONARY   (+) Simple chronic bronchitis (HCC)   (-) Sleep apnea      Nervous and Auditory   (+) Chronic inflammatory demyelinating polyneuropathy (HCC)        Physical Exam    Airway    Mallampati score: II  TM Distance: >3 FB  Neck ROM: limited     Dental   Comment: Edentulous, upper dentures and lower dentures,     Cardiovascular      Pulmonary      Other Findings        Anesthesia Plan  ASA Score- 3     Anesthesia Type- general with ASA Monitors  Additional Monitors:   Airway Plan: ETT  Plan Factors-Exercise tolerance (METS): >4 METS  Chart reviewed  EKG reviewed  Existing labs reviewed  Patient summary reviewed  Patient is not a current smoker  Obstructive sleep apnea risk education given perioperatively  Induction- intravenous  Postoperative Plan- Plan for postoperative opioid use  Informed Consent- Anesthetic plan and risks discussed with patient and spouse

## 2022-07-29 NOTE — INTERVAL H&P NOTE
H&P reviewed  After examining the patient I find no changes in the patients condition since the H&P had been written      Vitals:    07/29/22 1059   BP: 130/74   Pulse: 57   Resp: 20   Temp: 97 8 °F (36 6 °C)   SpO2: 98%

## 2022-07-29 NOTE — ANESTHESIA POSTPROCEDURE EVALUATION
Post-Op Assessment Note    CV Status:  Stable    Pain management: adequate     Mental Status:  Alert and awake   Hydration Status:  Euvolemic   PONV Controlled:  Controlled   Airway Patency:  Patent   Two or more mitigation strategies used for obstructive sleep apnea   Post Op Vitals Reviewed: Yes      Staff: Anesthesiologist         There were no known complications for this encounter      /59 (07/29/22 1345)    Temp (!) 97 °F (36 1 °C) (07/29/22 1345)    Pulse 65 (07/29/22 1345)   Resp 12 (07/29/22 1345)    SpO2 100 % (07/29/22 1345)

## 2022-08-05 ENCOUNTER — RA CDI HCC (OUTPATIENT)
Dept: OTHER | Facility: HOSPITAL | Age: 87
End: 2022-08-05

## 2022-08-05 NOTE — PROGRESS NOTES
I50 9, I13  HCC coding opportunities          Chart Reviewed number of suggestions sent to Provider: 2     Patients Insurance     Medicare Insurance: Medicare          0

## 2022-08-19 RX ORDER — BRIMONIDINE TARTRATE 2 MG/ML
SOLUTION/ DROPS OPHTHALMIC
COMMUNITY
Start: 2022-08-01

## 2022-08-23 ENCOUNTER — OFFICE VISIT (OUTPATIENT)
Dept: FAMILY MEDICINE CLINIC | Facility: CLINIC | Age: 87
End: 2022-08-23
Payer: MEDICARE

## 2022-08-23 VITALS
RESPIRATION RATE: 19 BRPM | SYSTOLIC BLOOD PRESSURE: 110 MMHG | TEMPERATURE: 97.2 F | HEART RATE: 71 BPM | WEIGHT: 179 LBS | DIASTOLIC BLOOD PRESSURE: 58 MMHG | BODY MASS INDEX: 20.71 KG/M2 | OXYGEN SATURATION: 92 % | HEIGHT: 78 IN

## 2022-08-23 DIAGNOSIS — G61.81 CHRONIC INFLAMMATORY DEMYELINATING POLYNEUROPATHY (HCC): Primary | ICD-10-CM

## 2022-08-23 DIAGNOSIS — I10 ESSENTIAL HYPERTENSION: ICD-10-CM
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
## 2022-08-23 DIAGNOSIS — Z79.899 ENCOUNTER FOR LONG-TERM (CURRENT) USE OF MEDICATIONS: ICD-10-CM

## 2022-08-23 DIAGNOSIS — R26.2 AMBULATORY DYSFUNCTION: ICD-10-CM

## 2022-08-23 DIAGNOSIS — E78.2 MIXED HYPERLIPIDEMIA: ICD-10-CM

## 2022-08-23 DIAGNOSIS — Z71.89 LIVING WILL, COUNSELING/DISCUSSION: ICD-10-CM

## 2022-08-23 PROCEDURE — 99214 OFFICE O/P EST MOD 30 MIN: CPT | Performed by: FAMILY MEDICINE

## 2022-08-23 NOTE — PROGRESS NOTES
Assessment/Plan:  81 yo male, seen with wife, Nicole Paris, in NAD and is well-known to me for many years presents for f/u and evaluation of the following medical issues:     0  Dr Hilda Whitfield operated on the - did bx - vocal cord - was benign  (old smoker)      1  Chronic inflammatory demyelinating polyneuropathy ([address] )    2  Essential hypertension  Comments:  controlled on low dose med  3  Mixed hyperlipidemia  Comments:  on Zocor 20 mg OD    4  Ambulatory dysfunction    5  Encounter for long-term (current) use of medications    6  Fall, subsequent encounter  Comments:  last fall was May 29, 2022 at home  Leg and forearm are healed now  7  Living will, counseling/discussion  Comments:  Just had living will updated  It is in the computer  Subjective:      Patient ID: Jean Claude Peguero is a 80 y o  male  HPI    The following portions of the patient's history were reviewed and updated as appropriate: He  has a past medical history of Ambulates with cane, Cancer ([address] ), Coronary artery disease, Dementia ([address] ), Difficulty swallowing, Fatigue, Heart disease, History of pneumonia, History of recent fall (05/31/2022), [place] (hard of hearing), Hyperlipidemia, Hypertension, Joint pain, Risk for falls, Shortness of breath, and Voice disorder    He   Patient Active Problem List    Diagnosis Date Noted    Coronary artery disease     Vitamin D deficiency 06/30/2022    Spinal stenosis of lumbar region 06/30/2022    Dementia ([facility] ) 06/30/2022    Platelets decreased ([address] ) 04/21/2022    Glaucoma of both eyes 08/24/2021    PAD (peripheral artery disease) ([address] ) 02/17/2021    Essential hypertension 06/17/2020    Mixed hyperlipidemia 06/17/2020    Simple chronic bronchitis ([address] ) 06/17/2020    Chronic inflammatory demyelinating polyneuropathy ([address] ) 06/17/2020    Late onset Alzheimer'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 ([address] ) 06/17/2020    Squamous cell carcinoma of face 04/14/2017     He  has a past surgical history that includes Excision basal cell carcinoma (10/11/2019); Coronary angioplasty with stent (01/01/2007); Cataract extraction, bilateral (Bilateral, 01/01/2014); Corneal transplant (Left, 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); Skin cancer excision (Left, 04/28/2017); Tonsillectomy; Colonoscopy; Hernia repair (Right, 02/2022); and pr laryngoscopy,dirct,op scope,biopsy (Right, 7/29/2022)  His family history includes No Known Problems in his father and mother  He  reports that he has quit smoking  His smoking use included cigarettes  He has a 32 00 pack-year smoking history  He has never used smokeless tobacco  He reports current alcohol use  He reports that he does not use drugs    Current Outpatient Medications   Medication Sig Dispense Refill    acetaminophen (TYLENOL) 500 mg tablet Take 1,000 mg by mouth every 8 (eight) hours as needed      albuterol (PROVENTIL HFA,VENTOLIN HFA) 90 mcg/act inhaler Inhale 2 puffs every 4 (four) hours as needed for wheezing      aspirin (ECOTRIN LOW STRENGTH) 81 mg EC tablet Take 81 mg by mouth daily      atenolol (TENORMIN) 25 mg tablet Take 25 mg by mouth daily      brimonidine (ALPHAGAN P) 0 15 % ophthalmic solution Administer 1 drop into the left eye 3 (three) times a day      brimonidine tartrate 0 2 % ophthalmic solution INSTILL 1 DROP INTO LEFT EYE 3 TIMES A DAY      brinzolamide (AZOPT) 1 % ophthalmic suspension SHAKE LIQUID AND INSTILL 1 DROP IN LEFT EYE THREE TIMES DAILY      calcium citrate-vitamin D (CITRACAL+D) 315-200 MG-UNIT per tablet Take 1 tablet by mouth daily      cholecalciferol (VITAMIN D3) 1,000 units tablet Take 1,000 Units by mouth daily      cyanocobalamin (VITAMIN B-12) 1,000 mcg tablet Take by mouth daily      furosemide (LASIX) 20 mg tablet TAKE 1 TABLET BY MOUTH EVERY DAY (Patient taking differently: 3 (three) times a week) 30 tablet 6    influenza vaccine, high-dose (FLUZONE HIGH-DOSE) 0 5 ML ORLY Inject 0 5 mL into the shoulder, thigh, or buttocks once      irbesartan (AVAPRO) 150 mg tablet Take 75 mg by mouth daily      latanoprost (XALATAN) 0 005 % ophthalmic solution Administer 1 drop to both eyes daily at bedtime      Magnesium 500 MG CAPS Take by mouth      Omega-3 Fatty Acids (FISH OIL) 1,000 mg Take 1,200 mg by mouth daily      prednisoLONE acetate (PRED FORTE) 1 % ophthalmic suspension Administer 1 drop into the left eye 3 (three) times a day      simvastatin (ZOCOR) 40 mg tablet TAKE 20MG (ONE-HALF TABLET) BY MOUTH QD5P FOR CHOLESTEROL      thiamine (VITAMIN B1) 100 mg tablet Take 100 mg by mouth daily      mupirocin (BACTROBAN) 2 % ointment APPLY TO WOUND DAILY WITH EACH DRESSING CHANGE (Patient not taking: Reported on 8/23/2022)      nitroglycerin (NITROSTAT) 0 4 mg SL tablet Place 0 4 mg under the tongue (Patient not taking: No sig reported)       No current facility-administered medications for this visit       Current Outpatient Medications on File Prior to Visit   Medication Sig    acetaminophen (TYLENOL) 500 mg tablet Take 1,000 mg by mouth every 8 (eight) hours as needed    albuterol (PROVENTIL HFA,VENTOLIN HFA) 90 mcg/act inhaler Inhale 2 puffs every 4 (four) hours as needed for wheezing    aspirin (ECOTRIN LOW STRENGTH) 81 mg EC tablet Take 81 mg by mouth daily    atenolol (TENORMIN) 25 mg tablet Take 25 mg by mouth daily    brimonidine (ALPHAGAN P) 0 15 % ophthalmic solution Administer 1 drop into the left eye 3 (three) times a day    brimonidine tartrate 0 2 % ophthalmic solution INSTILL 1 DROP INTO LEFT EYE 3 TIMES A DAY    brinzolamide (AZOPT) 1 % ophthalmic suspension SHAKE LIQUID AND INSTILL 1 DROP IN LEFT EYE THREE TIMES DAILY    calcium citrate-vitamin D (CITRACAL+D) 315-200 MG-UNIT per tablet Take 1 tablet by mouth daily    cholecalciferol (VITAMIN D3) 1,000 units tablet Take 1,000 Units by mouth daily    cyanocobalamin (VITAMIN B-12) 1,000 mcg tablet Take by mouth daily    furosemide (LASIX) 20 mg tablet TAKE 1 TABLET BY MOUTH EVERY DAY (Patient taking differently: 3 (three) times a week)    influenza vaccine, high-dose (FLUZONE HIGH-DOSE) 0 5 ML ORLY Inject 0 5 mL into the shoulder, thigh, or buttocks once    irbesartan (AVAPRO) 150 mg tablet Take 75 mg by mouth daily    latanoprost (XALATAN) 0 005 % ophthalmic solution Administer 1 drop to both eyes daily at bedtime    Magnesium 500 MG CAPS Take by mouth    Omega-3 Fatty Acids (FISH OIL) 1,000 mg Take 1,200 mg by mouth daily    prednisoLONE acetate (PRED FORTE) 1 % ophthalmic suspension Administer 1 drop into the left eye 3 (three) times a day    simvastatin (ZOCOR) 40 mg tablet TAKE 20MG (ONE-HALF TABLET) BY MOUTH QD5P FOR CHOLESTEROL    thiamine (VITAMIN B1) 100 mg tablet Take 100 mg by mouth daily    mupirocin (BACTROBAN) 2 % ointment APPLY TO WOUND DAILY WITH EACH DRESSING CHANGE (Patient not taking: Reported on 8/23/2022)    nitroglycerin (NITROSTAT) 0 4 mg SL tablet Place 0 4 mg under the tongue (Patient not taking: No sig reported)     No current facility-administered medications on file prior to visit  He is allergic to contrast [iodinated diagnostic agents], gadolinium, and iodine - food allergy       Review of Systems   Constitutional: Negative for activity change, appetite change, chills, diaphoresis, fatigue, fever and unexpected weight change  HENT: Negative for congestion, dental problem, drooling, ear discharge, ear pain, facial swelling, mouth sores, nosebleeds, postnasal drip, rhinorrhea, trouble swallowing and voice change  Eyes: Negative for photophobia, pain, discharge, redness, itching and visual disturbance  Sees Tona gann, and Dr Gina Botello  Pressure up in L eye and they will erika in 2 wks  Respiratory: Negative for apnea, cough, choking, chest tightness and shortness of breath  [facility] [place] [facility] 2d shot in March, and will get booster when avilable      Cardiovascular: Negative for chest pain and leg swelling  Denies any and all cardiac symptoms   Gastrointestinal: Negative for abdominal distention, abdominal pain, constipation, diarrhea and nausea  Endocrine: Negative for polydipsia, polyphagia and polyuria  Genitourinary: Negative for decreased urine volume, difficulty urinating, dysuria, enuresis and hematuria  Musculoskeletal: Positive for gait problem  Negative for arthralgias, back pain, joint swelling and neck pain  Could C stenosis be partly responsible for his amb dysfunction? Well, not likely  No pain now, just dizzy  Skin: Negative for color change, pallor, rash and wound  Allergic/Immunologic: Negative for immunocompromised state  Neurological: Negative for dizziness, seizures, syncope, facial asymmetry, speech difficulty, light-headedness and headaches  Hematological: Negative for adenopathy  Psychiatric/Behavioral: Positive for confusion  Negative for agitation, behavioral problems, decreased concentration and hallucinations  The patient is not nervous/anxious  Objective:      /58 (BP Location: Left arm, Patient Position: Sitting, Cuff Size: Standard)   Pulse 71   Temp (!) 97 2 °F (36 2 °C) (Temporal)   Resp 19   Ht 6' 6" (1 981 m)   Wt 81 2 kg (179 lb)   SpO2 92%   BMI 20 69 kg/m²          Physical Exam  Vitals and nursing note reviewed  Constitutional:       Appearance: Normal appearance  He is well-developed and normal weight  He is not diaphoretic  HENT:      Head: Normocephalic and atraumatic  Nose: Nose normal    Eyes:      Pupils: Pupils are equal, round, and reactive to light  Neck:      Trachea: No tracheal deviation  Cardiovascular:      Rate and Rhythm: Normal rate and regular rhythm  Heart sounds: Normal heart sounds  Comments: Follow with Dr Marco Antonio Delgado, cardiologist   Had carotid tst Sept 2, 2021  Pulmonary:      Effort: Pulmonary effort is normal       Breath sounds: Normal breath sounds  No wheezing  Abdominal:      General: Bowel sounds are normal       Palpations: Abdomen is soft  Musculoskeletal:         General: Normal range of motion  Cervical back: Normal range of motion and neck supple  Lymphadenopathy:      Cervical: No cervical adenopathy  Skin:     General: Skin is warm and dry  Neurological:      Mental Status: He is alert and oriented to person, place, and time  Psychiatric:         Mood and Affect: Mood normal          Behavior: Behavior normal       Comments: Dementia persists  Getting worse:  Can't recall if ate or not, doesn't know day or night, and is "fixated on garage doors"  Wife, Glen Rodarte, runs the entire show now  30 min with pt and wife, and all the above addressed  This time was spent reviewing previous records, reviewing previous laboratory and other tests, taking history from patient, examination of patient, discussion of prognosis and treatment, ordering laboratory tests, ordering medications, and completion of the medical record

## 2022-10-07 ENCOUNTER — IMMUNIZATIONS (OUTPATIENT)
Dept: FAMILY MEDICINE CLINIC | Facility: CLINIC | Age: 87
End: 2022-10-07
Payer: MEDICARE

## 2022-10-07 DIAGNOSIS — Z23 NEED FOR INFLUENZA VACCINATION: Primary | ICD-10-CM

## 2022-10-07 PROCEDURE — 90662 IIV NO PRSV INCREASED AG IM: CPT

## 2022-10-07 PROCEDURE — G0008 ADMIN INFLUENZA VIRUS VAC: HCPCS

## 2022-10-27 DIAGNOSIS — I10 ESSENTIAL HYPERTENSION: ICD-10-CM

## 2022-10-27 RX ORDER — FUROSEMIDE 20 MG/1
TABLET ORAL
Qty: 30 TABLET | Refills: 6 | Status: SHIPPED | OUTPATIENT
Start: 2022-10-27

## 2022-12-19 ENCOUNTER — RA CDI HCC (OUTPATIENT)
Dept: OTHER | Facility: HOSPITAL | Age: 87
End: 2022-12-19

## 2022-12-19 NOTE — PROGRESS NOTES
I50 9, I13 0  [facility]  coding opportunities          Chart Reviewed number of suggestions sent to Provider: 2     Patients Insurance     Medicare Insurance: [employer]

## 2022-12-28 ENCOUNTER — OFFICE VISIT (OUTPATIENT)
Dept: FAMILY MEDICINE CLINIC | Facility: CLINIC | Age: 87
End: 2022-12-28

## 2022-12-28 VITALS
WEIGHT: 178.2 LBS | HEIGHT: 78 IN | HEART RATE: 70 BPM | BODY MASS INDEX: 20.62 KG/M2 | DIASTOLIC BLOOD PRESSURE: 74 MMHG | OXYGEN SATURATION: 98 % | SYSTOLIC BLOOD PRESSURE: 106 MMHG | TEMPERATURE: 97.4 F

## 2022-12-28 DIAGNOSIS — I10 ESSENTIAL HYPERTENSION: ICD-10-CM

## 2022-12-28 DIAGNOSIS — Z79.899 ENCOUNTER FOR LONG-TERM (CURRENT) USE OF MEDICATIONS: ICD-10-CM

## 2022-12-28 DIAGNOSIS — F02.80 LATE ONSET ALZHEIMER'S DISEASE WITHOUT BEHAVIORAL DISTURBANCE (HCC): Primary | ICD-10-CM

## 2022-12-28 DIAGNOSIS — G61.81 CHRONIC INFLAMMATORY DEMYELINATING POLYNEUROPATHY (HCC): ICD-10-CM

## 2022-12-28 DIAGNOSIS — G30.1 LATE ONSET ALZHEIMER'S DISEASE WITHOUT BEHAVIORAL DISTURBANCE (HCC): Primary | ICD-10-CM

## 2022-12-28 DIAGNOSIS — R26.2 AMBULATORY DYSFUNCTION: ICD-10-CM

## 2022-12-28 DIAGNOSIS — E78.2 MIXED HYPERLIPIDEMIA: ICD-10-CM
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
## 2022-12-28 NOTE — PROGRESS NOTES
Assessment/Plan:  79 yo male, in NAD, seen with wife - Montse Wen - calm, sleeping more:  5 pm to 9 am , plus naps in the daytime  Mind is shutting down  Dr Clara Salazar sees q6mo also  1  Late onset Alzheimer'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 ([facility] )  Comments:  conts to downward spiral, slowly, no recents falls, but dementia VERY bad    2  Essential hypertension  Comments:  controlled  3  Chronic inflammatory demyelinating polyneuropathy (HCC)  Comments:  more ambulatory dysfuntion - Dr Josh Wang retired  4  Ambulatory dysfunction    5  Mixed hyperlipidemia  Comments:  tolerating Zocor 40 mg     6  Encounter for long-term (current) use of medications  Comments: All reviewed and discussed  Tolerating well  7  Fall, subsequent encounter  Comments:  walks with cane        Subjective:      Patient ID: Cristin Adair is a 80 y o  male  HPI    The following portions of the patient's history were reviewed and updated as appropriate: He  has a past medical history of Ambulates with cane, Cancer ([facility] ), Coronary artery disease, Dementia ([facility] ), Difficulty swallowing, Fatigue, Heart disease, History of pneumonia, History of recent fall (05/31/2022), [place] (hard of hearing), Hyperlipidemia, Hypertension, Joint pain, Risk for falls, Shortness of breath, and Voice disorder    He   Patient Active Problem List    Diagnosis Date Noted   • Coronary artery disease    • Vitamin D deficiency 06/30/2022   • Spinal stenosis of lumbar region 06/30/2022   • Dementia ([facility] ) 06/30/2022   • Platelets decreased ([facility] ) 04/21/2022   • Glaucoma of both eyes 08/24/2021   • PAD (peripheral artery disease) ([facility] ) 02/17/2021   • Essential hypertension 06/17/2020   • Mixed hyperlipidemia 06/17/2020   • Simple chronic bronchitis ([facility] ) 06/17/2020   • Chronic inflammatory demyelinating polyneuropathy ([facility] ) 06/17/2020   • Late onset Alzheimer'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 ([facility] ) 06/17/2020   • Squamous cell carcinoma of face 04/14/2017     He  has a past surgical history that includes Excision basal cell carcinoma (10/11/2019); Coronary angioplasty with stent (01/01/2007); Cataract extraction, bilateral (Bilateral, 01/01/2014); Corneal transplant (Left, 01/01/2015); Skin cancer excision (Left, 04/28/2017); Tonsillectomy; Colonoscopy; Hernia repair (Right, 02/2022); and pr laryngoscopy w/biopsy microscope/telescope (Right, 7/29/2022)  His family history includes No Known Problems in his father and mother  He  reports that he has quit smoking  His smoking use included cigarettes  He has a 32 00 pack-year smoking history  He has never used smokeless tobacco  He reports current alcohol use  He reports that he does not use drugs    Current Outpatient Medications   Medication Sig Dispense Refill   • albuterol (PROVENTIL HFA,VENTOLIN HFA) 90 mcg/act inhaler Inhale 2 puffs every 4 (four) hours as needed for wheezing     • aspirin (ECOTRIN LOW STRENGTH) 81 mg EC tablet Take 81 mg by mouth daily     • atenolol (TENORMIN) 25 mg tablet Take 25 mg by mouth daily     • brimonidine (ALPHAGAN P) 0 15 % ophthalmic solution Administer 1 drop into the left eye 3 (three) times a day     • brimonidine tartrate 0 2 % ophthalmic solution INSTILL 1 DROP INTO LEFT EYE 3 TIMES A DAY     • brinzolamide (AZOPT) 1 % ophthalmic suspension SHAKE LIQUID AND INSTILL 1 DROP IN LEFT EYE THREE TIMES DAILY     • calcium citrate-vitamin D (CITRACAL+D) 315-200 MG-UNIT per tablet Take 1 tablet by mouth daily     • cholecalciferol (VITAMIN D3) 1,000 units tablet Take 1,000 Units by mouth daily     • cyanocobalamin (VITAMIN B-12) 1,000 mcg tablet Take by mouth daily     • furosemide (LASIX) 20 mg tablet TAKE 1 TABLET BY MOUTH EVERY DAY 30 tablet 6   • irbesartan (AVAPRO) 150 mg tablet Take 75 mg by mouth daily     • latanoprost (XALATAN) 0 005 % ophthalmic solution Administer 1 drop to both eyes daily at bedtime     • Magnesium 500 MG CAPS Take by mouth     • Omega-3 Fatty Acids (FISH OIL) 1,000 mg Take 1,200 mg by mouth daily     • prednisoLONE acetate (PRED FORTE) 1 % ophthalmic suspension Administer 1 drop into the left eye 3 (three) times a day     • simvastatin (ZOCOR) 40 mg tablet TAKE 20MG (ONE-HALF TABLET) BY MOUTH QD5P FOR CHOLESTEROL     • thiamine (VITAMIN B1) 100 mg tablet Take 100 mg by mouth daily       No current facility-administered medications for this visit       Current Outpatient Medications on File Prior to Visit   Medication Sig   • albuterol (PROVENTIL HFA,VENTOLIN HFA) 90 mcg/act inhaler Inhale 2 puffs every 4 (four) hours as needed for wheezing   • aspirin (ECOTRIN LOW STRENGTH) 81 mg EC tablet Take 81 mg by mouth daily   • atenolol (TENORMIN) 25 mg tablet Take 25 mg by mouth daily   • brimonidine (ALPHAGAN P) 0 15 % ophthalmic solution Administer 1 drop into the left eye 3 (three) times a day   • brimonidine tartrate 0 2 % ophthalmic solution INSTILL 1 DROP INTO LEFT EYE 3 TIMES A DAY   • brinzolamide (AZOPT) 1 % ophthalmic suspension SHAKE LIQUID AND INSTILL 1 DROP IN LEFT EYE THREE TIMES DAILY   • calcium citrate-vitamin D (CITRACAL+D) 315-200 MG-UNIT per tablet Take 1 tablet by mouth daily   • cholecalciferol (VITAMIN D3) 1,000 units tablet Take 1,000 Units by mouth daily   • cyanocobalamin (VITAMIN B-12) 1,000 mcg tablet Take by mouth daily   • furosemide (LASIX) 20 mg tablet TAKE 1 TABLET BY MOUTH EVERY DAY   • irbesartan (AVAPRO) 150 mg tablet Take 75 mg by mouth daily   • latanoprost (XALATAN) 0 005 % ophthalmic solution Administer 1 drop to both eyes daily at bedtime   • Magnesium 500 MG CAPS Take by mouth   • Omega-3 Fatty Acids (FISH OIL) 1,000 mg Take 1,200 mg by mouth daily   • prednisoLONE acetate (PRED FORTE) 1 % ophthalmic suspension Administer 1 drop into the left eye 3 (three) times a day   • simvastatin (ZOCOR) 40 mg tablet TAKE 20MG (ONE-HALF TABLET) BY MOUTH QD5P FOR CHOLESTEROL   • thiamine (VITAMIN B1) 100 mg tablet Take 100 mg by mouth daily   • [DISCONTINUED] influenza vaccine, high-dose (FLUZONE HIGH-DOSE) 0 5 ML ORLY Inject 0 5 mL into the shoulder, thigh, or buttocks once   • [DISCONTINUED] acetaminophen (TYLENOL) 500 mg tablet Take 1,000 mg by mouth every 8 (eight) hours as needed (Patient not taking: Reported on 12/28/2022)   • [DISCONTINUED] mupirocin (BACTROBAN) 2 % ointment APPLY TO WOUND DAILY WITH EACH DRESSING CHANGE (Patient not taking: Reported on 8/23/2022)   • [DISCONTINUED] nitroglycerin (NITROSTAT) 0 4 mg SL tablet Place 0 4 mg under the tongue (Patient not taking: Reported on 8/5/2022)     No current facility-administered medications on file prior to visit  He is allergic to contrast [iodinated contrast media], gadolinium, and iodine - food allergy       Review of Systems   Constitutional: Negative for activity change, appetite change, chills, diaphoresis, fatigue, fever and unexpected weight change  HENT: Negative for congestion, dental problem, drooling, ear discharge, ear pain, facial swelling, mouth sores, nosebleeds, postnasal drip, rhinorrhea, trouble swallowing and voice change  Eyes: Negative for photophobia, pain, discharge, redness, itching and visual disturbance  Sees Amanda Coronado- sangeeta willett there, and Dr Annemarie Arnold  Pressure up in L eye and they will erika in 2 wks  Respiratory: Negative for apnea, cough, choking, chest tightness and shortness of breath  [facility] - 2d shot in March, and will get booster when avilable  Cardiovascular: Negative for chest pain and leg swelling  Denies any and all cardiac symptoms   Gastrointestinal: Negative for abdominal distention, abdominal pain, constipation, diarrhea and nausea  Endocrine: Negative for polydipsia, polyphagia and polyuria  Genitourinary: Negative for decreased urine volume, difficulty urinating, dysuria, enuresis and hematuria  Musculoskeletal: Positive for gait problem   Negative for arthralgias, back pain, joint swelling and neck pain  Could C stenosis be partly responsible for his amb dysfunction? Well, not likely  No pain now, just dizzy  Skin: Negative for color change, pallor, rash and wound  Allergic/Immunologic: Negative for immunocompromised state  Neurological: Negative for dizziness, seizures, syncope, facial asymmetry, speech difficulty, light-headedness and headaches  Hematological: Negative for adenopathy  Psychiatric/Behavioral: Positive for confusion  Negative for agitation, behavioral problems, decreased concentration and hallucinations  The patient is not nervous/anxious  Objective:      /74 (BP Location: Left arm, Patient Position: Sitting, Cuff Size: Standard)   Pulse 70   Temp (!) 97 4 °F (36 3 °C) (Tympanic Core)   Ht 6' 6" (1 981 m)   Wt 80 8 kg (178 lb 3 2 oz)   SpO2 98%   BMI 20 59 kg/m²          Physical Exam  Vitals and nursing note reviewed  Constitutional:       Appearance: Normal appearance  He is well-developed and normal weight  He is not diaphoretic  HENT:      Head: Normocephalic and atraumatic  Nose: Nose normal    Eyes:      Pupils: Pupils are equal, round, and reactive to light  Neck:      Trachea: No tracheal deviation  Cardiovascular:      Rate and Rhythm: Normal rate and regular rhythm  Heart sounds: Normal heart sounds  Comments: Follow with Dr Virgil Corcoran, cardiologist   Had carotid tst Sept 2, 2021  Pulmonary:      Effort: Pulmonary effort is normal       Breath sounds: Normal breath sounds  No wheezing  Abdominal:      General: Bowel sounds are normal       Palpations: Abdomen is soft  Musculoskeletal:         General: Normal range of motion  Cervical back: Normal range of motion and neck supple  Lymphadenopathy:      Cervical: No cervical adenopathy  Skin:     General: Skin is warm and dry  Neurological:      Mental Status: He is alert and oriented to person, place, and time     Psychiatric: Mood and Affect: Mood normal          Behavior: Behavior normal       Comments: Dementia persists  Getting worse:  Can't recall if ate or not, doesn't know day or night, and is "fixated on garage doors"  Wife, Josh Sanchez, runs the entire show now  Pt seen from 10:20 to 10:51 a m and all discussed with wife  Pt is fairly deep in dementia    This time was spent reviewing previous records, reviewing previous laboratory and other tests, taking history from patient, examination of patient, discussion of prognosis and treatment, ordering laboratory tests, ordering medications, and completion of the medical record

## 2023-02-22 ENCOUNTER — HOSPITAL ENCOUNTER (EMERGENCY)
Facility: HOSPITAL | Age: 88
Discharge: HOME | End: 2023-02-22
Attending: EMERGENCY MEDICINE

## 2023-02-22 ENCOUNTER — APPOINTMENT (EMERGENCY)
Dept: CT IMAGING | Facility: HOSPITAL | Age: 88
End: 2023-02-22

## 2023-02-22 ENCOUNTER — APPOINTMENT (EMERGENCY)
Dept: RADIOLOGY | Facility: HOSPITAL | Age: 88
End: 2023-02-22

## 2023-02-22 VITALS
RESPIRATION RATE: 16 BRPM | HEART RATE: 55 BPM | DIASTOLIC BLOOD PRESSURE: 77 MMHG | WEIGHT: 195.99 LBS | TEMPERATURE: 98 F | SYSTOLIC BLOOD PRESSURE: 168 MMHG | OXYGEN SATURATION: 98 % | BODY MASS INDEX: 22.65 KG/M2

## 2023-02-22 DIAGNOSIS — S70.02XA CONTUSION OF LEFT HIP, INITIAL ENCOUNTER: ICD-10-CM

## 2023-02-22 DIAGNOSIS — S00.81XA ABRASION OF FACE, INITIAL ENCOUNTER: ICD-10-CM

## 2023-02-22 DIAGNOSIS — S01.81XA FACIAL LACERATION, INITIAL ENCOUNTER: ICD-10-CM
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
## 2023-02-22 DIAGNOSIS — S09.90XA INJURY OF HEAD, INITIAL ENCOUNTER: ICD-10-CM

## 2023-02-22 DIAGNOSIS — S40.012A CONTUSION OF LEFT SHOULDER, INITIAL ENCOUNTER: ICD-10-CM

## 2023-02-22 DIAGNOSIS — S01.21XA LACERATION OF NOSE, INITIAL ENCOUNTER: ICD-10-CM

## 2023-02-22 LAB
ANION GAP SERPL CALCULATED.3IONS-SCNC: 8 MMOL/L (ref 4–13)
BASOPHILS # BLD AUTO: 0.09 THOUSANDS/ÂΜL (ref 0–0.1)
BASOPHILS NFR BLD AUTO: 1 % (ref 0–1)
BUN SERPL-MCNC: 33 MG/DL (ref 5–25)
CALCIUM SERPL-MCNC: 9.2 MG/DL (ref 8.4–10.2)
CHLORIDE SERPL-SCNC: 109 MMOL/L (ref 96–108)
CO2 SERPL-SCNC: 23 MMOL/L (ref 21–32)
CREAT SERPL-MCNC: 1.36 MG/DL (ref 0.6–1.3)
EOSINOPHIL # BLD AUTO: 0.34 THOUSAND/ÂΜL (ref 0–0.61)
EOSINOPHIL NFR BLD AUTO: 5 % (ref 0–6)
ERYTHROCYTE [DISTWIDTH] IN BLOOD BY AUTOMATED COUNT: 13.1 % (ref 11.6–15.1)
GFR SERPL CREATININE-BSD FRML MDRD: 45 ML/MIN/1.73SQ M
GLUCOSE SERPL-MCNC: 129 MG/DL (ref 65–140)
HCT VFR BLD AUTO: 39.9 % (ref 36.5–49.3)
HGB BLD-MCNC: 12 G/DL (ref 12–17)
IMM GRANULOCYTES # BLD AUTO: 0.03 THOUSAND/UL (ref 0–0.2)
IMM GRANULOCYTES NFR BLD AUTO: 1 % (ref 0–2)
LYMPHOCYTES # BLD AUTO: 1.54 THOUSANDS/ÂΜL (ref 0.6–4.47)
LYMPHOCYTES NFR BLD AUTO: 24 % (ref 14–44)
MCH RBC QN AUTO: 29.9 PG (ref 26.8–34.3)
MCHC RBC AUTO-ENTMCNC: 30.1 G/DL (ref 31.4–37.4)
MCV RBC AUTO: 100 FL (ref 82–98)
MONOCYTES # BLD AUTO: 0.58 THOUSAND/ÂΜL (ref 0.17–1.22)
MONOCYTES NFR BLD AUTO: 9 % (ref 4–12)
NEUTROPHILS # BLD AUTO: 3.95 THOUSANDS/ÂΜL (ref 1.85–7.62)
NEUTS SEG NFR BLD AUTO: 60 % (ref 43–75)
NRBC BLD AUTO-RTO: 0 /100 WBCS
PLATELET # BLD AUTO: 144 THOUSANDS/UL (ref 149–390)
PMV BLD AUTO: 11.5 FL (ref 8.9–12.7)
POTASSIUM SERPL-SCNC: 4.4 MMOL/L (ref 3.5–5.3)
RBC # BLD AUTO: 4.01 MILLION/UL (ref 3.88–5.62)
SODIUM SERPL-SCNC: 140 MMOL/L (ref 135–147)
WBC # BLD AUTO: 6.53 THOUSAND/UL (ref 4.31–10.16)

## 2023-02-22 RX ORDER — GINSENG 100 MG
1 CAPSULE ORAL ONCE
Status: COMPLETED | OUTPATIENT
Start: 2023-02-22 | End: 2023-02-22

## 2023-02-22 RX ORDER — ACETAMINOPHEN 325 MG/1
650 TABLET ORAL ONCE
Status: COMPLETED | OUTPATIENT
Start: 2023-02-22 | End: 2023-02-22

## 2023-02-22 RX ADMIN — ACETAMINOPHEN 650 MG: 325 TABLET ORAL at 18:32

## 2023-02-22 RX ADMIN — BACITRACIN 1 SMALL APPLICATION: 500 OINTMENT TOPICAL at 18:32

## 2023-02-23 ENCOUNTER — TELEPHONE (OUTPATIENT)
Dept: FAMILY MEDICINE CLINIC | Facility: CLINIC | Age: 88
End: 2023-02-23

## 2023-02-23 LAB
ATRIAL RATE: 64 BPM
P AXIS: 94 DEGREES
PR INTERVAL: 254 MS
QRS AXIS: -23 DEGREES
QRSD INTERVAL: 94 MS
QT INTERVAL: 424 MS
QTC INTERVAL: 437 MS
T WAVE AXIS: 59 DEGREES
VENTRICULAR RATE: 64 BPM

## 2023-02-23 NOTE — ED PROCEDURE NOTE
PROCEDURE  ECG 12 Lead Documentation Only    Date/Time: 2/22/2023 7:59 PM  Performed by: Josiane Lozoya MD  Authorized by: Josiane Lozoya MD     Indications / Diagnosis:  FALL- DEMENTIA   ECG reviewed by me, the ED Provider: yes    Patient location:  ED  Previous ECG:     Previous ECG:  Unavailable    Comparison to cardiac monitor: Yes    Interpretation:     Interpretation: non-specific    Rate:     ECG rate:  64    ECG rate assessment: normal    Rhythm:     Rhythm: sinus rhythm    Ectopy:     Ectopy: none    QRS:     QRS axis:  Normal    QRS intervals:  Normal  Conduction:     Conduction: abnormal      Abnormal conduction: 1st degree    ST segments:     ST segments:  Normal  T waves:     T waves: flattening      Flattening:  AVL and V1  Q waves:     Q waves:  V1  Other findings:     Other findings: U wave    Comments:      LOW VOLTAGE CRITERIA - NO ECG SIGNS OF ISCHEMIA/ INJURY          Josiane Lozoya MD  02/22/23 2001

## 2023-02-23 NOTE — TELEPHONE ENCOUNTER
Pt was in the ER yesterday for a fall  Pt's wife called to ask Dr Millie Tubbs about not taking his baby aspirin daily, as it was the recommendation of the ER doc to stop taking it because it can cause dizziness  Please advise

## 2023-02-23 NOTE — DISCHARGE INSTRUCTIONS
Diagnosis: fall/ head injury // facial abrasion/ laceration/ nasal laceration / left shoulder contusion/left hip contusion- bruises    - activity as tolerated     - please use walker  at home    - you have 3 sutures in your right forehead and 2 in your nose- they need to be removed in 7 days-- keep clean and dry for 24 hrs - afterward can get gently wet -- blot dry-- can apply over the counter topical antibiotics lightly over areas for next 3 days- wash off at night -     - for any pain- over the counter generic tylenol 500 mg every 4 hrs while awake     - please return to  the er for any signs of wound infection - spreading redness/warmth/swelling / pus coming from wounds or any further falls     - please discuss with his doctor if he should stop the daily aspirin given his falls

## 2023-02-23 NOTE — ED PROCEDURE NOTE
PROCEDURE  Laceration repair    Date/Time: 2/22/2023 7:47 PM  Performed by: Chris Villar MD  Authorized by: Chris Villar MD   Consent: Verbal consent obtained  Risks and benefits: risks, benefits and alternatives were discussed  Consent given by: patient  Patient understanding: patient states understanding of the procedure being performed  Required blood products, implants, devices, and special equipment available: NONE  Patient identity confirmed: verbally with patient  Time out: Immediately prior to procedure a "time out" was called to verify the correct patient, procedure, equipment, support staff and site/side marked as required  Body area: head/neck  Location details: nose  Laceration length: 0 5 cm  Contaminated: NONE  Tendon involvement: none  Nerve involvement: none  Vascular damage: no  Anesthesia: local infiltration    Anesthesia:  Local Anesthetic: bupivacaine 0 5% without epinephrine  Anesthetic total: 1 mL    Sedation:  Patient sedated: no      Wound Dehiscence:  Superficial Wound Dehiscence: simple closure      Procedure Details:  Preparation: Patient was prepped and draped in the usual sterile fashion  Irrigation solution: STERILE WATER   Amount of cleaning: standard  Debridement: none  Degree of undermining: none  Skin closure: 5-0 nylon  Number of sutures: 2  Technique: buried suture  Approximation: close  Approximation difficulty: simple  Patient tolerance: patient tolerated the procedure well with no immediate complications  Comments: BACITRACIN ANTIBIOTIC PLACED OVER NOSE   Foreign body: NONE           Chris Villar MD  02/22/23 5476

## 2023-02-23 NOTE — ED PROCEDURE NOTE
PROCEDURE  Laceration repair    Date/Time: 2/22/2023 7:45 PM  Performed by: Panfilo Leyva MD  Authorized by: Panfilo Leyva MD   Consent: Verbal consent obtained  Risks and benefits: risks, benefits and alternatives were discussed  Consent given by: patient  Patient understanding: patient states understanding of the procedure being performed  Required blood products, implants, devices, and special equipment available: NONE  Patient identity confirmed: verbally with patient  Time out: Immediately prior to procedure a "time out" was called to verify the correct patient, procedure, equipment, support staff and site/side marked as required  Body area: head/neck  Location details: forehead  Laceration length: 1 cm  Contaminated: NONE  Tendon involvement: none  Nerve involvement: none  Vascular damage: no  Anesthesia: local infiltration    Anesthesia:  Local Anesthetic: bupivacaine 0 5% without epinephrine  Anesthetic total: 3 mL    Sedation:  Patient sedated: no      Wound Dehiscence:  Superficial Wound Dehiscence: simple closure      Procedure Details:  Irrigation solution: STERILE WATER   Irrigation method: jet lavage  Amount of cleaning: standard  Debridement: none  Degree of undermining: none  Skin closure: 5-0 nylon  Number of sutures: 3  Technique: simple  Approximation: close  Approximation difficulty: simple  Dressing: antibiotic ointment  Comments: MACERATED R FOREHEAD  1 CM LACERATION -Mireya CASH MD--   Foreign body: NONE           Panfilo Leyva MD  02/22/23 1951

## 2023-02-23 NOTE — TRAUMA DOCUMENTATION
Pt was able to ambulate up and down the hallway, with minium to no assistance  Denies dizziness and lightheadedness while ambulating and at this time  Provider made aware

## 2023-02-24 DIAGNOSIS — Z91.81 AT RISK FOR INJURY RELATED TO FALL: Primary | ICD-10-CM

## 2023-02-24 DIAGNOSIS — R26.2 AMBULATORY DYSFUNCTION: ICD-10-CM

## 2023-02-24 NOTE — PROGRESS NOTES
Patient and wife came into office with a script from hospital for a walker but needs a walker with wheels, a new script give to patient

## 2023-02-27 NOTE — ED PROVIDER NOTES
History  Chief Complaint   Patient presents with   • Fall     EMS reports pt was walking to the mailbox and fell face forward on pavement  +thinners, -LOC, +head strike  Wife reports he also fell yesterday  80 yr male with hx of dementia -- as per wife states pt went out to get mail and  Lafrances Merritts from feet and hit head-- helped by neighbor who came to his aid-- pt c/o  Headache and left shoulder/ hip area  pain -- pt able to walk upon getting helped up by wife-- wife states pt currently at baseline does have a hx of falls--       History provided by:  Spouse and patient  History limited by:  Dementia   used: No        Prior to Admission Medications   Prescriptions Last Dose Informant Patient Reported? Taking?    Magnesium 500 MG CAPS   Yes No   Sig: Take by mouth   Omega-3 Fatty Acids (FISH OIL) 1,000 mg   Yes No   Sig: Take 1,200 mg by mouth daily   albuterol (PROVENTIL HFA,VENTOLIN HFA) 90 mcg/act inhaler   Yes No   Sig: Inhale 2 puffs every 4 (four) hours as needed for wheezing   aspirin (ECOTRIN LOW STRENGTH) 81 mg EC tablet   Yes No   Sig: Take 81 mg by mouth daily   atenolol (TENORMIN) 25 mg tablet   Yes No   Sig: Take 25 mg by mouth daily   brimonidine (ALPHAGAN P) 0 15 % ophthalmic solution   Yes No   Sig: Administer 1 drop into the left eye 3 (three) times a day   brimonidine tartrate 0 2 % ophthalmic solution   Yes No   Sig: INSTILL 1 DROP INTO LEFT EYE 3 TIMES A DAY   brinzolamide (AZOPT) 1 % ophthalmic suspension   Yes No   Sig: SHAKE LIQUID AND INSTILL 1 DROP IN LEFT EYE THREE TIMES DAILY   calcium citrate-vitamin D (CITRACAL+D) 315-200 MG-UNIT per tablet   Yes No   Sig: Take 1 tablet by mouth daily   cholecalciferol (VITAMIN D3) 1,000 units tablet   Yes No   Sig: Take 1,000 Units by mouth daily   cyanocobalamin (VITAMIN B-12) 1,000 mcg tablet   Yes No   Sig: Take by mouth daily   furosemide (LASIX) 20 mg tablet   No No   Sig: TAKE 1 TABLET BY MOUTH EVERY DAY   irbesartan (AVAPRO) 150 mg tablet   Yes No   Sig: Take 75 mg by mouth daily   latanoprost (XALATAN) 0 005 % ophthalmic solution   Yes No   Sig: Administer 1 drop to both eyes daily at bedtime   prednisoLONE acetate (PRED FORTE) 1 % ophthalmic suspension   Yes No   Sig: Administer 1 drop into the left eye 3 (three) times a day   simvastatin (ZOCOR) 40 mg tablet   Yes No   Sig: TAKE 20MG (ONE-HALF TABLET) BY MOUTH QD5P FOR CHOLESTEROL   thiamine (VITAMIN B1) 100 mg tablet   Yes No   Sig: Take 100 mg by mouth daily      Facility-Administered Medications: None       Past Medical History:   Diagnosis Date   • Ambulates with cane    • Cancer (HCC)     skin; basal cell carcinoma    • Coronary artery disease    • Dementia (HCC)    • Difficulty swallowing     per wife will not use "Thick it powder"   • Fatigue    • Heart disease    • History of pneumonia    • History of recent fall 05/31/2022   • [place] (hard of hearing)     will not wear hearing aids   • Hyperlipidemia    • Hypertension    • Joint pain     hands   • Risk for falls    • Shortness of breath    • Voice disorder        Past Surgical History:   Procedure Laterality Date   • BASAL CELL CARCINOMA EXCISION  10/11/2019    Removed from back and face   • CATARACT EXTRACTION, BILATERAL Bilateral 01/01/2014   • COLONOSCOPY     • 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   • CORONARY ANGIOPLASTY WITH STENT PLACEMENT  01/01/2007   • HERNIA REPAIR Right 02/2022    inguinal   • DC LARYNGOSCOPY W/BIOPSY MICROSCOPE/TELESCOPE Right 7/29/2022    Procedure: MICORLARYNGOSCOPY & BIOPSY ;  Surgeon: Rochelle Dunne MD;  Location: [facility];  Service: ENT   • SKIN CANCER EXCISION Left 04/28/2017    skin cancer removed from left side of face   • TONSILLECTOMY         Family History   Problem Relation Age of Onset   • No Known Problems Mother    • No Known Problems Father      I have reviewed and agree with the history as documented      E-Cigarette/Vaping   • E-Cigarette Use Never User      E-Cigarette/Vaping Substances   • Nicotine No    • THC No    • CBD No    • Flavoring No    • Other No    • Unknown No      Social History     Tobacco Use   • Smoking status: Former     Packs/day: 1 00     Years: 32 00     Pack years: 32 00     Types: Cigarettes   • Smokeless tobacco: Never   • Tobacco comments:     smoked age [age], about 1 ppd    Vaping Use   • Vaping Use: Never used   Substance Use Topics   • Alcohol use: Yes     Comment: monthly   • Drug use: No       Review of Systems   Constitutional: Negative  HENT: Negative  Eyes: Negative  Respiratory: Negative  Cardiovascular: Negative  Gastrointestinal: Negative  Endocrine: Negative  Genitourinary: Negative  Musculoskeletal: Negative  Skin: Positive for wound  Negative for color change, pallor and rash  r forehead laceration-nasal laceration    Allergic/Immunologic: Negative  Neurological: Negative  Hematological: Negative  Psychiatric/Behavioral: Negative  Physical Exam  Physical Exam  Vitals and nursing note reviewed  Constitutional:       General: He is not in acute distress  Appearance: Normal appearance  He is not ill-appearing, toxic-appearing or diaphoretic  Comments: avss- htnsive-- pulse ox 98 % on ra- interpretation is normal- no intervention    HENT:      Head: Normocephalic  Comments: r forehead with abrasions/ superficial skin avulsions with 1 cm linear macerated laceration      Right Ear: Tympanic membrane, ear canal and external ear normal  There is no impacted cerumen  Left Ear: Tympanic membrane, ear canal and external ear normal  There is no impacted cerumen  Nose: No congestion or rhinorrhea  Comments: 1 cm horizontal lnear mid nasal laceration - no nasal deformity/ no spetal hematomas     Mouth/Throat:      Mouth: Mucous membranes are moist       Pharynx: Oropharynx is clear  No oropharyngeal exudate or posterior oropharyngeal erythema     Eyes:      General: No scleral icterus  Right eye: No discharge  Left eye: No discharge  Extraocular Movements: Extraocular movements intact  Conjunctiva/sclera: Conjunctivae normal       Pupils: Pupils are equal, round, and reactive to light  Comments: Mm pink   Neck:      Vascular: No carotid bruit  Comments: No pmt c/t/l/s spine- cna not clear clinically do to dementia  Cardiovascular:      Rate and Rhythm: Normal rate and regular rhythm  Pulses: Normal pulses  Heart sounds: Normal heart sounds  No murmur heard  No friction rub  No gallop  Pulmonary:      Effort: Pulmonary effort is normal  No respiratory distress  Breath sounds: Normal breath sounds  No stridor  No wheezing, rhonchi or rales  Chest:      Chest wall: No tenderness  Abdominal:      General: Bowel sounds are normal  There is no distension  Palpations: Abdomen is soft  There is no mass  Tenderness: There is no abdominal tenderness  There is no right CVA tenderness, left CVA tenderness, guarding or rebound  Hernia: No hernia is present  Comments: Soft nt nd- no hsm- no cva tenderness- no ascites- no peritoneal signs- no pulsatile abd mass/bruit/ tenderness    Musculoskeletal:         General: Tenderness and signs of injury present  No swelling or deformity  Normal range of motion  Cervical back: Normal range of motion and neck supple  No rigidity or tenderness  Right lower leg: No edema  Left lower leg: No edema  Comments: lue- nt at clavicle/ac jt tenderness- mild diffuse lateral shoulder tendenress and prox to mid humeral tenderness- no deformmity /ecchymosis-- normal rom at shoulder-- left lateral hip area mild tenderness upon active rom -- no overlying ecchymosis-- normal bulmaro/strength at lefty hip- nt at pelvis   Lymphadenopathy:      Cervical: No cervical adenopathy  Skin:     General: Skin is warm  Capillary Refill: Capillary refill takes less than 2 seconds  Coloration: Skin is not jaundiced or pale  Findings: No bruising, erythema, lesion or rash  Neurological:      General: No focal deficit present  Mental Status: He is alert  Mental status is at baseline  Cranial Nerves: No cranial nerve deficit  Sensory: No sensory deficit  Motor: No weakness        Comments: A and o x 2-- baseline- non focal neuro exam    Psychiatric:         Mood and Affect: Mood normal          Behavior: Behavior normal          Vital Signs  ED Triage Vitals   Temperature Pulse Respirations Blood Pressure SpO2   02/22/23 1801 02/22/23 1800 02/22/23 1800 02/22/23 1800 02/22/23 1800   98 °F (36 7 °C) 66 16 168/85 98 %      Temp Source Heart Rate Source Patient Position - Orthostatic VS BP Location FiO2 (%)   02/22/23 1801 02/22/23 1800 02/22/23 1800 02/22/23 1801 --   Oral Monitor Lying Right arm       Pain Score       02/22/23 1801       No Pain           Vitals:    02/22/23 2205 02/22/23 2210 02/22/23 2214 02/22/23 2220   BP:       Pulse: 55 56 60 55   Patient Position - Orthostatic VS:             Visual Acuity  Visual Acuity    Flowsheet Row Most Recent Value   L Pupil Size (mm) 3   R Pupil Size (mm) 3          ED Medications  Medications   acetaminophen (TYLENOL) tablet 650 mg (650 mg Oral Given 2/22/23 1832)   bacitracin topical ointment 1 small application (1 small application Topical Given 2/22/23 1832)       Diagnostic Studies  Results Reviewed     Procedure Component Value Units Date/Time    Basic metabolic panel [[id number]]  (Abnormal) Collected: 02/22/23 1833    Lab Status: Final result Specimen: Blood from Arm, Right Updated: 02/22/23 1902     Sodium 140 mmol/L      Potassium 4 4 mmol/L      Chloride 109 mmol/L      CO2 23 mmol/L      ANION GAP 8 mmol/L      BUN 33 mg/dL      Creatinine 1 36 mg/dL      Glucose 129 mg/dL      Calcium 9 2 mg/dL      eGFR 45 ml/min/1 73sq m     Narrative:      [place] guidelines for Chronic Kidney Disease (CKD):   •  Stage 1 with normal or high GFR (GFR > 90 mL/min/1 73 square meters)  •  Stage 2 Mild CKD (GFR = 60-89 mL/min/1 73 square meters)  •  Stage 3A Moderate CKD (GFR = 45-59 mL/min/1 73 square meters)  •  Stage 3B Moderate CKD (GFR = 30-44 mL/min/1 73 square meters)  •  Stage 4 Severe CKD (GFR = 15-29 mL/min/1 73 square meters)  •  Stage 5 End Stage CKD (GFR <15 mL/min/1 73 square meters)  Note: GFR calculation is accurate only with a steady state creatinine    CBC and differential [[id number]]  (Abnormal) Collected: 02/22/23 1833    Lab Status: Final result Specimen: Blood from Arm, Right Updated: 02/22/23 1856     WBC 6 53 Thousand/uL      RBC 4 01 Million/uL      Hemoglobin 12 0 g/dL      Hematocrit 39 9 %       fL      MCH 29 9 pg      MCHC 30 1 g/dL      RDW 13 1 %      MPV 11 5 fL      Platelets 048 Thousands/uL      nRBC 0 /100 WBCs      Neutrophils Relative 60 %      Immat GRANS % 1 %      Lymphocytes Relative 24 %      Monocytes Relative 9 %      Eosinophils Relative 5 %      Basophils Relative 1 %      Neutrophils Absolute 3 95 Thousands/µL      Immature Grans Absolute 0 03 Thousand/uL      Lymphocytes Absolute 1 54 Thousands/µL      Monocytes Absolute 0 58 Thousand/µL      Eosinophils Absolute 0 34 Thousand/µL      Basophils Absolute 0 09 Thousands/µL                  XR shoulder 2+ views LEFT   Final Result by Katy Altman MD (02/23 1207)      No acute osseous abnormality  Workstation performed: [id number]         XR humerus LEFT   Final Result by Katy Altman MD (02/23 1209)      No acute osseous abnormality  Workstation performed: [id number]         XR hip/pelv 2-3 vws left   Final Result by Katy Altman MD (02/23 1208)      No acute osseous abnormality              Workstation performed: [id number]         CT head without contrast   Final Result by Katy Altman MD (02/22 1912)      No intracranial hemorrhage or calvarial fracture  Right frontal scalp swelling and scalp hematoma  The study was marked in [facility] for immediate notification  Marked as an Immediate given the trauma C indication                  Workstation performed: [id number]         CT facial bones without contrast   Final Result by Russell King MD (02/22 1918)      No acute fracture  Workstation performed: [id number]         CT cervical spine without contrast   Final Result by Russell King MD (02/22 1916)      No cervical spine fracture or traumatic malalignment                     Workstation performed: [id number]                    Procedures  Procedures         ED Course  ED Course as of 02/27/23 1655   Wed Feb 22, 2023   0410 ER MD NOTE-  6/22 LABS ALL REVIEWED BY ER MD   1840 ER MD NOTE- WIFE STATES  PT W HAD TD WITHIN 10 YRS    1957 ER MD NOTE- LABS REVIEWED AND COMPARED BY ER MD- NO SIGN CNAGES   2050 LEFT SHOULDER/ HUMERUS XRAY - NO FX/ SEPARATION/ DISLOCATION NO PTX/ RIB FX SEEN/ NO SQ AIR  - PELVIS / LEFT HIP XRAY NO FX    2214 - er md note- pt- re-evaluated- pt ambulated at baseline with walker-- wife feels ok taking pt home                                             Medical Decision Making  Dementia Geriatric ground level fall with head injury -- and lue/lle injury -- pt with hx of ckd-- will check labs/ ecg as ll as appropriate imaging tests-- unsure given dementia if any medical symptoms caused fall- thought wife doubts this    Abrasion of face, initial encounter: acute illness or injury     Details: will need wound care insructions   Contusion of left hip, initial encounter: acute illness or injury     Details: supporitve care   Contusion of left shoulder, initial encounter: acute illness or injury     Details: supportive care   Facial laceration, initial encounter: acute illness or injury     Details: w)pt will need priamry closure and wound care instructions  Fall, initial encounter: acute illness or injury     Details: acute on chronic   Injury of head, initial encounter: acute illness or injury     Details: baded on age/asa usage -- pt will need head ct   Laceration of nose, initial encounter: acute illness or injury     Details: will need wound closure and wound care instructions   Amount and/or Complexity of Data Reviewed  Independent Historian: spouse  External Data Reviewed: labs  Details: previous labs reviewed and compared by er md   Labs: ordered  Decision-making details documented in ED Course  Details: reviewed and comapred   Radiology: ordered and independent interpretation performed  Details: all reviewed by er md   ECG/medicine tests: ordered and independent interpretation performed  Details: reviewed and comared by er md   Discussion of management or test interpretation with external provider(s): Moderate amount of er md thought complexity and workup - pt able to tolerate ambulation with walker at baseline wife ok with taking pt home    Risk  OTC drugs  Decision regarding hospitalization  Disposition  Final diagnoses:   Fall, initial encounter   Injury of head, initial encounter   Abrasion of face, initial encounter   Facial laceration, initial encounter   Laceration of nose, initial encounter   Contusion of left shoulder, initial encounter   Contusion of left hip, initial encounter     Time reflects when diagnosis was documented in both MDM as applicable and the Disposition within this note     Time User Action Codes Description Comment    2/22/2023 10:15 PM Lanza Saenz Add [T85  ANOH] Fall, initial encounter     2/22/2023 10:15 PM Lanza Saenz Add [S41 [id number]] Injury of head, initial encounter     2/22/2023 10:16 PM Lanza Saenz Add [S00 81XA] Abrasion of face, initial encounter     2/22/2023 10:16 PM Lanza Saenz Add [S01 81XA] Facial laceration, initial encounter     2/22/2023 10:16 PM Lanza Saenz Add [Q88 [id number]] Laceration of nose, initial encounter     2/22/2023 10:17 PM Karina Ben Add [S40 [id number]] Contusion of left shoulder, initial encounter     2/22/2023 10:17 PM Karina Hodgesjose Add [S70 02XA] Contusion of left hip, initial encounter       ED Disposition     ED Disposition   Discharge    Condition   Stable    Date/Time   Wed Feb 22, 2023 10:15 PM    Comment   Laineygus Skpedro [place] discharge to home/self care                 Follow-up Information    None         Discharge Medication List as of 2/22/2023 10:22 PM      CONTINUE these medications which have NOT CHANGED    Details   albuterol (PROVENTIL HFA,VENTOLIN HFA) 90 mcg/act inhaler Inhale 2 puffs every 4 (four) hours as needed for wheezing, Historical Med      aspirin (ECOTRIN LOW STRENGTH) 81 mg EC tablet Take 81 mg by mouth daily, Historical Med      atenolol (TENORMIN) 25 mg tablet Take 25 mg by mouth daily, Historical Med      brimonidine (ALPHAGAN P) 0 15 % ophthalmic solution Administer 1 drop into the left eye 3 (three) times a day, Historical Med      brimonidine tartrate 0 2 % ophthalmic solution INSTILL 1 DROP INTO LEFT EYE 3 TIMES A DAY, Historical Med      brinzolamide (AZOPT) 1 % ophthalmic suspension SHAKE LIQUID AND INSTILL 1 DROP IN LEFT EYE THREE TIMES DAILY, Historical Med      calcium citrate-vitamin D (CITRACAL+D) 315-200 MG-UNIT per tablet Take 1 tablet by mouth daily, Historical Med      cholecalciferol (VITAMIN D3) 1,000 units tablet Take 1,000 Units by mouth daily, Historical Med      cyanocobalamin (VITAMIN B-12) 1,000 mcg tablet Take by mouth daily, Historical Med      furosemide (LASIX) 20 mg tablet TAKE 1 TABLET BY MOUTH EVERY DAY, Normal      irbesartan (AVAPRO) 150 mg tablet Take 75 mg by mouth daily, Historical Med      latanoprost (XALATAN) 0 005 % ophthalmic solution Administer 1 drop to both eyes daily at bedtime, Historical Med      Magnesium 500 MG CAPS Take by mouth, Historical Med      Omega-3 Fatty Acids (FISH OIL) 1,000 mg Take 1,200 mg by mouth daily, Historical Med      prednisoLONE acetate (PRED FORTE) 1 % ophthalmic suspension Administer 1 drop into the left eye 3 (three) times a day, Historical Med      simvastatin (ZOCOR) 40 mg tablet TAKE 20MG (ONE-HALF TABLET) BY MOUTH QD5P FOR CHOLESTEROL, Historical Med      thiamine (VITAMIN B1) 100 mg tablet Take 100 mg by mouth daily, Historical Med             No discharge procedures on file      PDMP Review     None          ED Provider  Electronically Signed by           Spenser Roper MD  03/01/23 7192

## 2023-03-01 ENCOUNTER — OFFICE VISIT (OUTPATIENT)
Dept: URGENT CARE | Age: 88
End: 2023-03-01

## 2023-03-01 VITALS
HEART RATE: 68 BPM | DIASTOLIC BLOOD PRESSURE: 56 MMHG | RESPIRATION RATE: 18 BRPM | SYSTOLIC BLOOD PRESSURE: 115 MMHG | OXYGEN SATURATION: 95 % | TEMPERATURE: 97.2 F

## 2023-03-01 DIAGNOSIS — Z48.02 ENCOUNTER FOR REMOVAL OF SUTURES: Primary | ICD-10-CM

## 2023-03-01 NOTE — PATIENT INSTRUCTIONS
Continue to keep wounds clean, dry and covered  May apply small amount of antibiotic ointment such as Neosporin/Bacitracin  Monitor for signs of wound infection such as redness, swelling, pus or fever

## 2023-03-01 NOTE — PROGRESS NOTES
[facility]        NAME: Philippe Arana is a 80 y o  male  : 1933    MRN: [id number]  DATE: 2023  TIME: 12:45 PM    Assessment and Plan   Encounter for removal of sutures [Z48 02]  1  Encounter for removal of sutures        Continue to keep wounds clean, dry and covered  May apply small amount of antibiotic ointment such as Neosporin/Bacitracin  Monitor for signs of wound infection such as redness, swelling, pus or fever  Patient Instructions   Wound Infection   AMBULATORY CARE:   A wound infection  occurs when bacteria enters a break in the skin  The infection may involve just the skin, or affect deeper tissues or organs close to the wound  Signs and symptoms of a wound infection:  Your symptoms may start a few days after you get the wound, or may not occur for a month or two after the wound happens:  • Fever     • Warm, red, painful, or swollen skin near the wound     • Blood or pus coming from the wound      • A foul odor coming from the wound     Seek care immediately if:   • You feel short of breath       • Your heart is beating faster than usual       • You feel confused       • Blood soaks through your bandages      • Your wound comes apart or feels like it is ripping       • You have severe pain      • You see red streaks coming from the infected area      Contact your healthcare provider if:   • You have a fever or chills       • You have more pain, redness, or swelling near your wound      • Your symptoms do not improve       • The skin around your wound feels numb      • You have questions or concerns about your condition or care      Treatment for a wound infection  will depend on how severe the wound is, its location, and whether other areas are affected  It may also depend on your health and the length of time you have had the wound   Ask your provider about these and other treatments you may need:  • Medicine  will be given to treat the infection and decrease pain and swelling       • Wound care  may be done to clean your wound and help it heal  A wound vacuum may also be placed over your wound to help it heal       • Hyperbaric oxygen therapy  (HBO) may be used to get more oxygen to your tissues to help them heal  The pressurized oxygen is given as you sit in a pressure chamber       • Surgery  may be needed to clean the wound or remove infected or dead tissue  Surgery may also be needed to remove a foreign object      Care for your wound as directed:  Keep your wound clean and dry  You may need to cover your wound when you bathe so it does not get wet  Clean your wound as directed with soap and water or wound   Put on new, clean bandages as directed  Change your bandages when they get wet or dirty  Help your wound heal:   • Eat a variety of healthy foods  Examples include fruits, vegetables, whole-grain breads, low-fat dairy products, beans, lean meats, and fish  Healthy foods may help you heal faster  You may also need to take vitamins and minerals  Ask if you need to be on a special diet      • Manage other health conditions  Follow your provider's directions to manage health conditions that can cause slow wound healing  Examples include high blood pressure and diabetes      • Do not smoke  Nicotine and other chemicals in cigarettes and cigars can cause slow wound healing  Ask your provider for information if you currently smoke and need help to quit  E-cigarettes or smokeless tobacco still contain nicotine  Talk to your provider before you use these products      Follow up with your healthcare provider in 1 to 2 days:  Write down your questions so you remember to ask them during your visits  © Copyright [facility] 2022 Information is for End User's use only and may not be sold, redistributed or otherwise used for commercial purposes  The above information is an  only  It is not intended as medical advice for individual conditions or treatments  Talk to your doctor, nurse or pharmacist before following any medical regimen to see if it is safe and effective for you  Follow up with PCP in 3-5 days  Proceed to  ER if symptoms worsen  Chief Complaint     Chief Complaint   Patient presents with   • Suture / Staple Removal     Above right eyebrow and bridge of nose, stiches in since 2/23/23, site is clean, dry and intact, no redness or swelling at site,          History of Present Illness       Patient is an 29-year-old male with past medical history significant for dementia, hypertension, falls, who presents with wife for suture removal   He was seen on 2/22/2023 after falling in his home and sustaining a laceration above his right eyebrow and across the bridge of his nose  At that point, 5 sutures were put in place, 3 above the eyebrow and 2 on the nose  Multiple CT scans taken at that time were negative  Wife denies fever, drainage, swelling or redness  Suture / Staple Removal        Review of Systems   Review of Systems   Constitutional: Negative for fatigue and fever  HENT: Negative for congestion, ear discharge, ear pain, postnasal drip, rhinorrhea, sinus pressure, sinus pain, sneezing and sore throat  Eyes: Negative  Negative for pain, discharge, redness and itching  Respiratory: Negative  Negative for apnea, cough, choking, chest tightness, shortness of breath, wheezing and stridor  Cardiovascular: Negative  Negative for chest pain and palpitations  Gastrointestinal: Negative  Negative for diarrhea, nausea and vomiting  Endocrine: Negative  Negative for polydipsia, polyphagia and polyuria  Genitourinary: Negative  Negative for decreased urine volume and flank pain  Musculoskeletal: Negative  Negative for arthralgias, back pain, myalgias, neck pain and neck stiffness  Skin: Positive for wound  Negative for color change and rash  Allergic/Immunologic: Negative  Negative for environmental allergies     Neurological: Negative  Negative for dizziness, facial asymmetry, light-headedness, numbness and headaches  Hematological: Negative  Negative for adenopathy  Psychiatric/Behavioral: Negative            Current Medications       Current Outpatient Medications:   •  albuterol (PROVENTIL HFA,VENTOLIN HFA) 90 mcg/act inhaler, Inhale 2 puffs every 4 (four) hours as needed for wheezing, Disp: , Rfl:   •  aspirin (ECOTRIN LOW STRENGTH) 81 mg EC tablet, Take 81 mg by mouth daily, Disp: , Rfl:   •  atenolol (TENORMIN) 25 mg tablet, Take 25 mg by mouth daily, Disp: , Rfl:   •  brimonidine (ALPHAGAN P) 0 15 % ophthalmic solution, Administer 1 drop into the left eye 3 (three) times a day, Disp: , Rfl:   •  brimonidine tartrate 0 2 % ophthalmic solution, INSTILL 1 DROP INTO LEFT EYE 3 TIMES A DAY, Disp: , Rfl:   •  brinzolamide (AZOPT) 1 % ophthalmic suspension, SHAKE LIQUID AND INSTILL 1 DROP IN LEFT EYE THREE TIMES DAILY, Disp: , Rfl:   •  calcium citrate-vitamin D (CITRACAL+D) 315-200 MG-UNIT per tablet, Take 1 tablet by mouth daily, Disp: , Rfl:   •  cholecalciferol (VITAMIN D3) 1,000 units tablet, Take 1,000 Units by mouth daily, Disp: , Rfl:   •  cyanocobalamin (VITAMIN B-12) 1,000 mcg tablet, Take by mouth daily, Disp: , Rfl:   •  furosemide (LASIX) 20 mg tablet, TAKE 1 TABLET BY MOUTH EVERY DAY, Disp: 30 tablet, Rfl: 6  •  irbesartan (AVAPRO) 150 mg tablet, Take 75 mg by mouth daily, Disp: , Rfl:   •  latanoprost (XALATAN) 0 005 % ophthalmic solution, Administer 1 drop to both eyes daily at bedtime, Disp: , Rfl:   •  Magnesium 500 MG CAPS, Take by mouth, Disp: , Rfl:   •  Omega-3 Fatty Acids (FISH OIL) 1,000 mg, Take 1,200 mg by mouth daily, Disp: , Rfl:   •  prednisoLONE acetate (PRED FORTE) 1 % ophthalmic suspension, Administer 1 drop into the left eye 3 (three) times a day, Disp: , Rfl:   •  simvastatin (ZOCOR) 40 mg tablet, TAKE 20MG (ONE-HALF TABLET) BY MOUTH QD5P FOR CHOLESTEROL, Disp: , Rfl:   •  thiamine (VITAMIN B1) 100 mg tablet, Take 100 mg by mouth daily, Disp: , Rfl:     Current Allergies     Allergies as of 03/01/2023 - Reviewed 03/01/2023   Allergen Reaction Noted   • Contrast [iodinated contrast media] Rash 11/01/2007   • Gadolinium Itching 09/20/2020   • Iodine - food allergy Rash 11/01/2007            The following portions of the patient's history were reviewed and updated as appropriate: allergies, current medications, past family history, past medical history, past social history, past surgical history and problem list      Past Medical History:   Diagnosis Date   • Ambulates with cane    • Cancer ([facility] [address] )     skin; basal cell carcinoma    • Coronary artery disease    • Dementia ([facility] [address] )    • Difficulty swallowing     per wife will not use "Thick it powder"   • Fatigue    • Heart disease    • History of pneumonia    • History of recent fall 05/31/2022   • [place] (hard of hearing)     will not wear hearing aids   • Hyperlipidemia    • Hypertension    • Joint pain     hands   • Risk for falls    • Shortness of breath    • Voice disorder        Past Surgical History:   Procedure Laterality Date   • BASAL CELL CARCINOMA EXCISION  10/11/2019    Removed from back and face   • CATARACT EXTRACTION, BILATERAL Bilateral 01/01/2014   • COLONOSCOPY     • 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   • CORONARY ANGIOPLASTY WITH STENT PLACEMENT  01/01/2007   • HERNIA REPAIR Right 02/2022    inguinal   • ME LARYNGOSCOPY W/BIOPSY MICROSCOPE/TELESCOPE Right 7/29/2022    Procedure: MICORLARYNGOSCOPY & BIOPSY ;  Surgeon: Conrad Stanton MD;  Location: [facility];  Service: ENT   • SKIN CANCER EXCISION Left 04/28/2017    skin cancer removed from left side of face   • TONSILLECTOMY         Family History   Problem Relation Age of Onset   • No Known Problems Mother    • No Known Problems Father          Medications have been verified          Objective   /56   Pulse 68   Temp (!) 97 2 °F (36 2 °C)   Resp 18   SpO2 95%        Physical Exam Physical Exam  Vitals reviewed  Constitutional:       General: He is not in acute distress  Appearance: Normal appearance  He is not ill-appearing, toxic-appearing or diaphoretic  Interventions: He is not intubated  HENT:      Head: Normocephalic and atraumatic  Right Ear: Tympanic membrane, ear canal and external ear normal  There is no impacted cerumen  Left Ear: Tympanic membrane, ear canal and external ear normal  There is no impacted cerumen  Nose: Nose normal  No congestion or rhinorrhea  Mouth/Throat:      Mouth: Mucous membranes are moist       Pharynx: Oropharynx is clear  Uvula midline  No pharyngeal swelling, oropharyngeal exudate, posterior oropharyngeal erythema or uvula swelling  Tonsils: No tonsillar exudate or tonsillar abscesses  1+ on the right  1+ on the left  Eyes:      Extraocular Movements: Extraocular movements intact  Conjunctiva/sclera: Conjunctivae normal       Pupils: Pupils are equal, round, and reactive to light  Cardiovascular:      Rate and Rhythm: Normal rate and regular rhythm  Pulses: Normal pulses  Heart sounds: Normal heart sounds, S1 normal and S2 normal  Heart sounds not distant  No murmur heard  No friction rub  No gallop  Pulmonary:      Effort: Pulmonary effort is normal  No tachypnea, bradypnea, accessory muscle usage, prolonged expiration, respiratory distress or retractions  He is not intubated  Breath sounds: Normal breath sounds  No stridor, decreased air movement or transmitted upper airway sounds  No decreased breath sounds, wheezing, rhonchi or rales  Chest:      Chest wall: No tenderness  Musculoskeletal:         General: Normal range of motion  Cervical back: Normal range of motion and neck supple  No rigidity or tenderness  Lymphadenopathy:      Cervical: No cervical adenopathy  Skin:     General: Skin is warm and dry        Capillary Refill: Capillary refill takes less than 2 seconds  Findings: Laceration present  Comments: Well-healing laceration above right eyebrow, bridge of nose  X 3 sutures in place above eyebrow, x 2 on nose  (-) swelling, drainage, erythema   Neurological:      General: No focal deficit present  Mental Status: He is alert  Psychiatric:         Mood and Affect: Mood normal        Suture removal    Date/Time: 3/1/2023 12:58 PM  Performed by: VIANCA Rondon  Authorized by: VIANCA Rondon   Universal Protocol:  Consent: Verbal consent obtained  Risks and benefits: risks, benefits and alternatives were discussed  Consent given by: patient and spouse  Patient understanding: patient states understanding of the procedure being performed  Patient identity confirmed: verbally with patient and provided demographic data        Patient location:  Clinic  Location:     Location:  [address] [facility] location:  [place] (above right eyebrow and bridge of nose)  Procedure details: Tools used:  Suture removal kit    Wound appearance:  No sign(s) of infection, good wound healing, nontender, clean and moist    Sutures removed: 5  Post-procedure details:     Post-removal:  Band-Aid applied    Patient tolerance of procedure:   Tolerated well, no immediate complications

## 2023-04-21 PROBLEM — N18.32 STAGE 3B CHRONIC KIDNEY DISEASE (HCC): Status: ACTIVE | Noted: 2023-04-21

## 2023-04-21 PROBLEM — I13.0 HYPERTENSIVE HEART AND RENAL DISEASE WITH CONGESTIVE HEART FAILURE (HCC): Status: ACTIVE | Noted: 2023-04-21

## 2023-05-02 ENCOUNTER — APPOINTMENT (OUTPATIENT)
Dept: LAB | Facility: AMBULARY SURGERY CENTER | Age: 88
End: 2023-05-02

## 2023-05-02 DIAGNOSIS — E55.9 VITAMIN D INSUFFICIENCY: ICD-10-CM
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
## 2023-05-02 DIAGNOSIS — G61.81 CHRONIC INFLAMMATORY DEMYELINATING POLYNEUROPATHY (HCC): ICD-10-CM

## 2023-05-02 DIAGNOSIS — I10 ESSENTIAL HYPERTENSION: ICD-10-CM

## 2023-05-02 DIAGNOSIS — R53.83 FATIGUE, UNSPECIFIED TYPE: ICD-10-CM

## 2023-05-02 DIAGNOSIS — E78.2 MIXED HYPERLIPIDEMIA: ICD-10-CM

## 2023-05-02 LAB
25(OH)D3 SERPL-MCNC: 42.5 NG/ML (ref 30–100)
ALBUMIN SERPL BCP-MCNC: 3.7 G/DL (ref 3.5–5)
ALP SERPL-CCNC: 75 U/L (ref 46–116)
ALT SERPL W P-5'-P-CCNC: 13 U/L (ref 12–78)
ANION GAP SERPL CALCULATED.3IONS-SCNC: 7 MMOL/L (ref 4–13)
AST SERPL W P-5'-P-CCNC: 13 U/L (ref 5–45)
BILIRUB SERPL-MCNC: 1.09 MG/DL (ref 0.2–1)
BUN SERPL-MCNC: 38 MG/DL (ref 5–25)
CALCIUM SERPL-MCNC: 9.4 MG/DL (ref 8.3–10.1)
CHLORIDE SERPL-SCNC: 108 MMOL/L (ref 96–108)
CHOLEST SERPL-MCNC: 199 MG/DL
CO2 SERPL-SCNC: 25 MMOL/L (ref 21–32)
CREAT SERPL-MCNC: 1.57 MG/DL (ref 0.6–1.3)
ERYTHROCYTE [DISTWIDTH] IN BLOOD BY AUTOMATED COUNT: 12.4 % (ref 11.6–15.1)
GFR SERPL CREATININE-BSD FRML MDRD: 38 ML/MIN/1.73SQ M
GLUCOSE P FAST SERPL-MCNC: 103 MG/DL (ref 65–99)
HCT VFR BLD AUTO: 40.8 % (ref 36.5–49.3)
HDLC SERPL-MCNC: 84 MG/DL
HGB BLD-MCNC: 12.3 G/DL (ref 12–17)
LDLC SERPL CALC-MCNC: 105 MG/DL (ref 0–100)
MCH RBC QN AUTO: 29.5 PG (ref 26.8–34.3)
MCHC RBC AUTO-ENTMCNC: 30.1 G/DL (ref 31.4–37.4)
MCV RBC AUTO: 98 FL (ref 82–98)
NONHDLC SERPL-MCNC: 115 MG/DL
PLATELET # BLD AUTO: 167 THOUSANDS/UL (ref 149–390)
PMV BLD AUTO: 11.7 FL (ref 8.9–12.7)
POTASSIUM SERPL-SCNC: 4.1 MMOL/L (ref 3.5–5.3)
PROT SERPL-MCNC: 6.9 G/DL (ref 6.4–8.4)
RBC # BLD AUTO: 4.17 MILLION/UL (ref 3.88–5.62)
SODIUM SERPL-SCNC: 140 MMOL/L (ref 135–147)
TRIGL SERPL-MCNC: 52 MG/DL
TSH SERPL DL<=0.05 MIU/L-ACNC: 0.99 UIU/ML (ref 0.45–4.5)
WBC # BLD AUTO: 7.34 THOUSAND/UL (ref 4.31–10.16)

## 2023-05-03 ENCOUNTER — APPOINTMENT (OUTPATIENT)
Dept: LAB | Facility: AMBULARY SURGERY CENTER | Age: 88
End: 2023-05-03

## 2023-05-08 ENCOUNTER — TELEPHONE (OUTPATIENT)
Dept: FAMILY MEDICINE CLINIC | Facility: CLINIC | Age: 88
End: 2023-05-08

## 2023-05-08 NOTE — TELEPHONE ENCOUNTER
Patient received a lab slip in the mail for a urine microscopic   Is this a duplicate ?   Please call her   Thank you

## 2023-07-30 DIAGNOSIS — I10 ESSENTIAL HYPERTENSION: ICD-10-CM

## 2023-07-31 RX ORDER — FUROSEMIDE 20 MG/1
TABLET ORAL
Qty: 30 TABLET | Refills: 6 | Status: SHIPPED | OUTPATIENT
Start: 2023-07-31

## 2023-08-10 ENCOUNTER — RA CDI HCC (OUTPATIENT)
Dept: OTHER | Facility: HOSPITAL | Age: 88
End: 2023-08-10

## 2023-08-10 NOTE — PROGRESS NOTES
Previous suggestions used  [address] coding opportunities       Chart reviewed, no opportunity found: CHART REVIEWED, NO OPPORTUNITY FOUND        Patients Insurance     Medicare Insurance: [employer]

## 2023-08-17 ENCOUNTER — OFFICE VISIT (OUTPATIENT)
Dept: FAMILY MEDICINE CLINIC | Facility: CLINIC | Age: 88
End: 2023-08-17
Payer: MEDICARE

## 2023-08-17 VITALS
HEIGHT: 78 IN | BODY MASS INDEX: 20.32 KG/M2 | DIASTOLIC BLOOD PRESSURE: 50 MMHG | OXYGEN SATURATION: 96 % | WEIGHT: 175.6 LBS | HEART RATE: 103 BPM | TEMPERATURE: 97.7 F | SYSTOLIC BLOOD PRESSURE: 96 MMHG

## 2023-08-17 DIAGNOSIS — I10 ESSENTIAL HYPERTENSION: Primary | ICD-10-CM

## 2023-08-17 DIAGNOSIS — R26.2 AMBULATORY DYSFUNCTION: ICD-10-CM

## 2023-08-17 DIAGNOSIS — Z71.2 ENCOUNTER TO DISCUSS TEST RESULTS: ICD-10-CM

## 2023-08-17 DIAGNOSIS — Z79.899 MEDICATION MANAGEMENT: ICD-10-CM

## 2023-08-17 DIAGNOSIS — G30.1 LATE ONSET ALZHEIMER'S DISEASE WITHOUT BEHAVIORAL DISTURBANCE (HCC): ICD-10-CM

## 2023-08-17 DIAGNOSIS — Z79.899 ENCOUNTER FOR LONG-TERM (CURRENT) USE OF MEDICATIONS: ICD-10-CM

## 2023-08-17 DIAGNOSIS — F02.80 LATE ONSET ALZHEIMER'S DISEASE WITHOUT BEHAVIORAL DISTURBANCE (HCC): ICD-10-CM

## 2023-08-17 DIAGNOSIS — R53.83 FATIGUE, UNSPECIFIED TYPE: ICD-10-CM

## 2023-08-17 DIAGNOSIS — J43.9 PULMONARY EMPHYSEMA, UNSPECIFIED EMPHYSEMA TYPE (HCC): ICD-10-CM

## 2023-08-17 DIAGNOSIS — R44.1 HALLUCINATION, VISUAL: ICD-10-CM

## 2023-08-17 DIAGNOSIS — N18.32 STAGE 3B CHRONIC KIDNEY DISEASE (HCC): ICD-10-CM

## 2023-08-17 PROCEDURE — 99214 OFFICE O/P EST MOD 30 MIN: CPT | Performed by: FAMILY MEDICINE
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
## 2024-01-03 ENCOUNTER — OFFICE VISIT (OUTPATIENT)
Dept: FAMILY MEDICINE CLINIC | Facility: CLINIC | Age: 89
End: 2024-01-03
Payer: MEDICARE

## 2024-01-03 VITALS
TEMPERATURE: 97.9 F | WEIGHT: 179 LBS | DIASTOLIC BLOOD PRESSURE: 58 MMHG | BODY MASS INDEX: 20.71 KG/M2 | HEIGHT: 78 IN | SYSTOLIC BLOOD PRESSURE: 94 MMHG | OXYGEN SATURATION: 95 % | HEART RATE: 71 BPM

## 2024-01-03 DIAGNOSIS — I10 ESSENTIAL HYPERTENSION: Primary | ICD-10-CM

## 2024-01-03 DIAGNOSIS — N18.32 STAGE 3B CHRONIC KIDNEY DISEASE (HCC): ICD-10-CM

## 2024-01-03 DIAGNOSIS — R53.83 FATIGUE, UNSPECIFIED TYPE: ICD-10-CM

## 2024-01-03 DIAGNOSIS — E44.1 MILD PROTEIN-CALORIE MALNUTRITION (HCC): ICD-10-CM

## 2024-01-03 DIAGNOSIS — E55.9 VITAMIN D INSUFFICIENCY: ICD-10-CM

## 2024-01-03 DIAGNOSIS — Z79.899 ENCOUNTER FOR LONG-TERM (CURRENT) USE OF MEDICATIONS: ICD-10-CM

## 2024-01-03 DIAGNOSIS — Z79.899 MEDICATION MANAGEMENT: ICD-10-CM

## 2024-01-03 DIAGNOSIS — E78.2 MIXED HYPERLIPIDEMIA: ICD-10-CM

## 2024-01-03 DIAGNOSIS — D69.6 PLATELETS DECREASED (HCC): ICD-10-CM

## 2024-01-03 PROCEDURE — 99214 OFFICE O/P EST MOD 30 MIN: CPT | Performed by: FAMILY MEDICINE

## 2024-01-03 NOTE — ASSESSMENT & PLAN NOTE
Certainly, it is of concern, but his wife reports that his appetite is very good. He eats 3 meals a day, sometimes even more.

## 2024-01-03 NOTE — ASSESSMENT & PLAN NOTE
His blood pressure is controlled at 100/60 mmHg. [facility] irbes[facility]losartan and Zocor.

## 2024-01-03 NOTE — ASSESSMENT & PLAN NOTE
His platelet count was corrected for the first time in several years, increasing to 167,000 uL. Previously, it was 144,000 uL, 137,000 uL, and even 112,000 uL in 02/2022, so there is marked improvement there. There is a concern for medication effect, etc.

## 2024-01-03 NOTE — ASSESSMENT & PLAN NOTE
All medications reviewed for safety, efficacy, and tolerance. Recommended to adjust the administration of Lasix from 3 times per week to once daily.

## 2024-01-03 NOTE — ASSESSMENT & PLAN NOTE
His most recent vitamin D level was 42 ng/mL, which we will continue to monitor. It will be rechecked with the next blood draw in the spring.

## 2024-01-03 NOTE — PROGRESS NOTES
Assessment/Plan:          1. Essential hypertension  Assessment & Plan:  His blood pressure is controlled at 100/60 mmHg. [facility]los[facility] and Zocor.    Orders:  -     CBC; Future  -     UA w Reflex to Microscopic w Reflex to Culture  -     Comprehensive metabolic panel; Future  -     Lipid panel; Future  -     TSH, 3rd generation; Future  -     Vitamin D 25 hydroxy; Future  -     Albumin / creatinine urine ratio    2. Encounter for long-term (current) use of medications    3. Fatigue, unspecified type  Assessment & Plan:  Recheck labs all reviewed from last spring.    Orders:  -     TSH, 3rd generation; Future    4. Stage 3b chronic kidney disease (HCC)  Assessment & Plan:  The last GFR was 38 mL/min/1.73m2, placing him in stage 3b.    Orders:  -     CBC; Future  -     UA w Reflex to Microscopic w Reflex to Culture  -     Comprehensive metabolic panel; Future  -     Lipid panel; Future  -     TSH, 3rd generation; Future  -     Vitamin D 25 hydroxy; Future  -     Albumin / creatinine urine ratio    5. Medication management  Assessment & Plan:  All medications reviewed for safety, efficacy, and tolerance. Recommended to adjust the administration of Lasix from 3 times per week to once daily.    Orders:  -     CBC; Future  -     UA w Reflex to Microscopic w Reflex to Culture  -     Comprehensive metabolic panel; Future  -     Lipid panel; Future  -     TSH, 3rd generation; Future  -     Vitamin D 25 hydroxy; Future  -     Albumin / creatinine urine ratio    6. Vitamin D insufficiency  Assessment & Plan:  His most recent vitamin D level was 42 ng/mL, which we will continue to monitor. It will be rechecked with the next blood draw in the spring.    Orders:  -     Vitamin D 25 hydroxy; Future    7. Mixed hyperlipidemia  Assessment & Plan:  Last cholesterol level was 199 mg/dL. [facility] simvastatin 20 mg.    Orders:  -     Lipid panel; Future    8. Mild protein-calorie  malnutrition (HCC)  Assessment & Plan:  Certainly, it is of concern, but his wife reports that his appetite is very good. He eats 3 meals a day, sometimes even more.      9. Platelets decreased (HCC)  Assessment & Plan:  His platelet count was corrected for the first time in several years, increasing to 167,000 uL. Previously, it was 144,000 uL, 137,000 uL, and even 112,000 uL in 02/2022, so there is marked improvement there. There is a concern for medication effect, etc.          All the diagnoses from the visit were discussed in detail with the patient, and especially with his wife, as his memory is failing.     Encounter for immunizations.  Recommend that he get his RSV and shingles vaccines, which were free at the pharmacy and must be  by 2 weeks.    The patient will follow up in 3 or 4 months.      Falls Plan of Care: balance, strength, and gait training instructions were provided and referral to physical therapy. Recommended assistive device to help with gait and balance. Home safety education provided. Referral to podiatry was provided. Sees Dr. Najera for toenail trimming - due next wk                 Subjective:       Patient ID: Alfredo Dahl is a 89 y.o. male, well known to me for many years, who presents for evaluation of multiple medical concerns. He is accompanied by his wife.    The patient is in relatively good physical condition. His blood pressure was normal at 94/58 mmHg.    He is deeply affected by dementia. According to his wife, his memory is significantly impaired. His sleep pattern is irregular, with some nights being more challenging than others. He often wakes up with the impression that he needs to do something or go somewhere, and it can be difficult to settle him back down. He sleeps a lot, typically going to bed at 6:30 PM and waking up at 10:00 AM the following morning. He denies experiencing any hallucinations.    His appetite is excellent.    His toenails are maintained by  "Dr. Najera. He has regular appointments every 9 weeks, with the next one due the following week.    The patient experienced a couple of falls, resulting in multiple bruises present on his body.    His skin is very dry, which leads to his wife applying Vaseline.    He is scheduled for surgery to treat skin cancer on his cheek on 01/11/2024.    He is scheduled for lab work with Dr. Palacios at the end of 01/2024.    He denies any swelling in his feet.    The patient is currently on low-dose aspirin 81 mg daily, Lasix 20 mg 3 times a week, Vitamin D3 1,000 units, and Vitamin B12. Previously, the patient was also taking simvastatin 20 mg and irbesartan/losartan. However, due to an episode of hypotension, [facility] decided to discontinue these medications.    He received 3 doses of the COVID-19 vaccine, his influenza vaccine on 10/02/2023, his pneumonia vaccine in 2021, and his Tdap in 2017 and 2022.    He had labs done on 05/02/2023 and 05/03/2023. On 05/02/2023, his cholesterol was 199 mg/dL. His kidney function is not within the normal range. His blood glucose was 103 mg/dL. His GFR was 38 mL/min/1.73m2. His hemoglobin was 12.3 g/dL. His platelet count was corrected for the first time in several years, increasing to 167,000 uL. Previously, it was 144,000 uL, 137,000 uL, and even as low as 112,000 uL in 02/2022, so there is marked improvement there.    The following portions of the patient's history were reviewed and updated as appropriate: allergies, current medications, past family history, past medical history, past social history, past surgical history, and problem list.              Objective:       BP 94/58 (BP Location: Left arm, Patient Position: Sitting, Cuff Size: Standard)   Pulse 71   Temp 97.9 °F (36.6 °C) (Temporal)   Ht 6' 6\" (1.981 m)   Wt 81.2 kg (179 lb)   SpO2 95%   BMI 20.69 kg/m²          Physical Exam  Vitals and nursing note reviewed.  Blood pressure: 94/58 mmHg.  Constitutional:       " General: He is not in acute distress.     Appearance: Normal appearance. He is well-developed.   HENT:      Head: Normocephalic and atraumatic.   Eyes:      General:         Right eye: No discharge.         Left eye: No discharge.   Neck:      Thyroid: No thyromegaly.   Cardiovascular:      Rate and Rhythm: Normal rate and regular rhythm.      Pulses: Normal pulses.      Heart sounds: Normal heart sounds. No murmur heard.  Pulmonary:      Effort: Pulmonary effort is normal.      Breath sounds: Normal breath sounds. No wheezing or rhonchi.   Musculoskeletal:      Cervical back: Neck supple.      Right lower leg: No edema.      Left lower leg: No edema.   Lymphadenopathy:      Cervical: No cervical adenopathy.   Skin:     General: Skin is dry.     Capillary Refill: Capillary refill takes less than 2 seconds.   Neurological:      General: No focal deficit present.      Mental Status: He is alert and oriented to person, place, and time.   Psychiatric:         Mood and Affect: Mood normal.         Behavior: Behavior normal.         Thought Content: Thought content normal.        I personally reviewed the recent (and prior)  lab results, the image studies, pathology, other specialty/physicians consult notes and recommendations, and outside medical records from other institutions, as appropriate. I had a lengthy discussion with the patient and shared the work-up findings. We discussed the diagnosis and management plan.  I spent  30  minutes reviewing the records (labs, clinician notes, outside records, medical history, ordering medicine/tests/procedures, interpreting the imaging/labs previously done) and coordination of care as well as direct time with the patient today, of which greater than 50% of the time was spent in counseling and coordination of care with the patient/family.    Transcribed for BRYAN Middleton DO, by Kaela Goyal on 01/07/24 at 4:28 PM. Powered by Dragon [employer].

## 2024-01-18 ENCOUNTER — TRANSCRIBE ORDERS (OUTPATIENT)
Dept: LAB | Facility: AMBULARY SURGERY CENTER | Age: 89
End: 2024-01-18

## 2024-01-18 ENCOUNTER — APPOINTMENT (OUTPATIENT)
Dept: LAB | Facility: AMBULARY SURGERY CENTER | Age: 89
End: 2024-01-18
Payer: MEDICARE

## 2024-01-18 DIAGNOSIS — I10 ESSENTIAL HYPERTENSION, MALIGNANT: ICD-10-CM

## 2024-01-18 DIAGNOSIS — I25.10: ICD-10-CM

## 2024-01-18 DIAGNOSIS — Z79.899 ENCOUNTER FOR LONG-TERM (CURRENT) USE OF OTHER MEDICATIONS: ICD-10-CM

## 2024-01-18 DIAGNOSIS — E78.00 HYPERCHOLESTEROLEMIA: Primary | ICD-10-CM

## 2024-01-18 LAB
ANION GAP SERPL CALCULATED.3IONS-SCNC: 12 MMOL/L
BNP SERPL-MCNC: 242 PG/ML (ref 0–100)
BUN SERPL-MCNC: 34 MG/DL (ref 5–25)
CALCIUM SERPL-MCNC: 9.6 MG/DL (ref 8.4–10.2)
CHLORIDE SERPL-SCNC: 106 MMOL/L (ref 96–108)
CO2 SERPL-SCNC: 28 MMOL/L (ref 21–32)
CREAT SERPL-MCNC: 1.61 MG/DL (ref 0.6–1.3)
GFR SERPL CREATININE-BSD FRML MDRD: 37 ML/MIN/1.73SQ M
GLUCOSE SERPL-MCNC: 117 MG/DL (ref 65–140)
POTASSIUM SERPL-SCNC: 5.3 MMOL/L (ref 3.5–5.3)
SODIUM SERPL-SCNC: 146 MMOL/L (ref 135–147)

## 2024-01-18 PROCEDURE — 80048 BASIC METABOLIC PNL TOTAL CA: CPT

## 2024-01-18 PROCEDURE — 36415 COLL VENOUS BLD VENIPUNCTURE: CPT

## 2024-01-18 PROCEDURE — 83880 ASSAY OF NATRIURETIC PEPTIDE: CPT

## 2024-03-11 ENCOUNTER — APPOINTMENT (OUTPATIENT)
Dept: LAB | Facility: AMBULARY SURGERY CENTER | Age: 89
End: 2024-03-11
Payer: MEDICARE

## 2024-03-11 DIAGNOSIS — I25.10 ATHEROSCLEROSIS OF NATIVE CORONARY ARTERY WITHOUT ANGINA PECTORIS, UNSPECIFIED WHETHER NATIVE OR TRANSPLANTED HEART: ICD-10-CM

## 2024-03-11 DIAGNOSIS — E78.00 PURE HYPERCHOLESTEROLEMIA: ICD-10-CM

## 2024-03-11 DIAGNOSIS — Z79.899 ENCOUNTER FOR LONG-TERM (CURRENT) USE OF OTHER MEDICATIONS: ICD-10-CM

## 2024-03-11 DIAGNOSIS — I10 ESSENTIAL HYPERTENSION, MALIGNANT: ICD-10-CM

## 2024-03-11 LAB
ANION GAP SERPL CALCULATED.3IONS-SCNC: 8 MMOL/L
BNP SERPL-MCNC: 363 PG/ML (ref 0–100)
BUN SERPL-MCNC: 39 MG/DL (ref 5–25)
CALCIUM SERPL-MCNC: 9.4 MG/DL (ref 8.4–10.2)
CHLORIDE SERPL-SCNC: 106 MMOL/L (ref 96–108)
CO2 SERPL-SCNC: 29 MMOL/L (ref 21–32)
CREAT SERPL-MCNC: 1.57 MG/DL (ref 0.6–1.3)
GFR SERPL CREATININE-BSD FRML MDRD: 38 ML/MIN/1.73SQ M
GLUCOSE P FAST SERPL-MCNC: 64 MG/DL (ref 65–99)
MAGNESIUM SERPL-MCNC: 2.4 MG/DL (ref 1.9–2.7)
POTASSIUM SERPL-SCNC: 4.8 MMOL/L (ref 3.5–5.3)
SODIUM SERPL-SCNC: 143 MMOL/L (ref 135–147)

## 2024-03-11 PROCEDURE — 36415 COLL VENOUS BLD VENIPUNCTURE: CPT

## 2024-03-11 PROCEDURE — 83880 ASSAY OF NATRIURETIC PEPTIDE: CPT

## 2024-03-11 PROCEDURE — 80048 BASIC METABOLIC PNL TOTAL CA: CPT

## 2024-03-11 PROCEDURE — 83735 ASSAY OF MAGNESIUM: CPT

## 2024-05-01 ENCOUNTER — APPOINTMENT (OUTPATIENT)
Dept: LAB | Facility: AMBULARY SURGERY CENTER | Age: 89
End: 2024-05-01
Payer: MEDICARE

## 2024-05-01 DIAGNOSIS — E55.9 VITAMIN D INSUFFICIENCY: ICD-10-CM

## 2024-05-01 DIAGNOSIS — R53.83 FATIGUE, UNSPECIFIED TYPE: ICD-10-CM

## 2024-05-01 DIAGNOSIS — N18.32 STAGE 3B CHRONIC KIDNEY DISEASE (HCC): ICD-10-CM

## 2024-05-01 DIAGNOSIS — E78.2 MIXED HYPERLIPIDEMIA: ICD-10-CM

## 2024-05-01 DIAGNOSIS — I10 ESSENTIAL HYPERTENSION: ICD-10-CM

## 2024-05-01 DIAGNOSIS — Z79.899 MEDICATION MANAGEMENT: ICD-10-CM

## 2024-05-01 LAB
25(OH)D3 SERPL-MCNC: 56.1 NG/ML (ref 30–100)
BACTERIA UR QL AUTO: ABNORMAL /HPF
BILIRUB UR QL STRIP: NEGATIVE
CHOLEST SERPL-MCNC: 177 MG/DL
CLARITY UR: CLEAR
COLOR UR: YELLOW
CREAT UR-MCNC: 153.2 MG/DL
ERYTHROCYTE [DISTWIDTH] IN BLOOD BY AUTOMATED COUNT: 13.2 % (ref 11.6–15.1)
GLUCOSE UR STRIP-MCNC: NEGATIVE MG/DL
HCT VFR BLD AUTO: 40.6 % (ref 36.5–49.3)
HDLC SERPL-MCNC: 63 MG/DL
HGB BLD-MCNC: 12.1 G/DL (ref 12–17)
HGB UR QL STRIP.AUTO: NEGATIVE
HYALINE CASTS #/AREA URNS LPF: ABNORMAL /LPF
KETONES UR STRIP-MCNC: NEGATIVE MG/DL
LDLC SERPL CALC-MCNC: 98 MG/DL (ref 0–100)
LEUKOCYTE ESTERASE UR QL STRIP: NEGATIVE
MCH RBC QN AUTO: 29.4 PG (ref 26.8–34.3)
MCHC RBC AUTO-ENTMCNC: 29.8 G/DL (ref 31.4–37.4)
MCV RBC AUTO: 99 FL (ref 82–98)
MICROALBUMIN UR-MCNC: 27.9 MG/L
MICROALBUMIN/CREAT 24H UR: 18 MG/G CREATININE (ref 0–30)
MUCOUS THREADS UR QL AUTO: ABNORMAL
NITRITE UR QL STRIP: NEGATIVE
NON-SQ EPI CELLS URNS QL MICRO: ABNORMAL /HPF
NONHDLC SERPL-MCNC: 114 MG/DL
PH UR STRIP.AUTO: 6 [PH]
PLATELET # BLD AUTO: 178 THOUSANDS/UL (ref 149–390)
PMV BLD AUTO: 11.8 FL (ref 8.9–12.7)
PROT UR STRIP-MCNC: ABNORMAL MG/DL
RBC # BLD AUTO: 4.12 MILLION/UL (ref 3.88–5.62)
RBC #/AREA URNS AUTO: ABNORMAL /HPF
SP GR UR STRIP.AUTO: 1.02 (ref 1–1.03)
TRIGL SERPL-MCNC: 79 MG/DL
TSH SERPL DL<=0.05 MIU/L-ACNC: 0.78 UIU/ML (ref 0.45–4.5)
UROBILINOGEN UR STRIP-ACNC: <2 MG/DL
WBC # BLD AUTO: 9.55 THOUSAND/UL (ref 4.31–10.16)
WBC #/AREA URNS AUTO: ABNORMAL /HPF

## 2024-05-01 PROCEDURE — 36415 COLL VENOUS BLD VENIPUNCTURE: CPT

## 2024-05-01 PROCEDURE — 85027 COMPLETE CBC AUTOMATED: CPT

## 2024-05-01 PROCEDURE — 80061 LIPID PANEL: CPT

## 2024-05-01 PROCEDURE — 82306 VITAMIN D 25 HYDROXY: CPT

## 2024-05-01 PROCEDURE — 84443 ASSAY THYROID STIM HORMONE: CPT

## 2024-05-01 PROCEDURE — 80053 COMPREHEN METABOLIC PANEL: CPT

## 2024-05-08 ENCOUNTER — OFFICE VISIT (OUTPATIENT)
Dept: FAMILY MEDICINE CLINIC | Facility: CLINIC | Age: 89
End: 2024-05-08
Payer: MEDICARE

## 2024-05-08 VITALS
HEART RATE: 105 BPM | TEMPERATURE: 98.1 F | BODY MASS INDEX: 19.46 KG/M2 | SYSTOLIC BLOOD PRESSURE: 90 MMHG | DIASTOLIC BLOOD PRESSURE: 58 MMHG | OXYGEN SATURATION: 99 % | WEIGHT: 168.2 LBS | HEIGHT: 78 IN

## 2024-05-08 DIAGNOSIS — N18.32 STAGE 3B CHRONIC KIDNEY DISEASE (HCC): ICD-10-CM

## 2024-05-08 DIAGNOSIS — F02.80 LATE ONSET ALZHEIMER'S DISEASE WITHOUT BEHAVIORAL DISTURBANCE (HCC): ICD-10-CM

## 2024-05-08 DIAGNOSIS — I10 ESSENTIAL HYPERTENSION: Primary | ICD-10-CM

## 2024-05-08 DIAGNOSIS — R26.2 AMBULATORY DYSFUNCTION: ICD-10-CM

## 2024-05-08 DIAGNOSIS — Z79.899 ENCOUNTER FOR LONG-TERM (CURRENT) USE OF MEDICATIONS: ICD-10-CM

## 2024-05-08 DIAGNOSIS — Z79.899 MEDICATION MANAGEMENT: ICD-10-CM

## 2024-05-08 DIAGNOSIS — Z00.00 MEDICARE ANNUAL WELLNESS VISIT, SUBSEQUENT: ICD-10-CM

## 2024-05-08 DIAGNOSIS — G30.1 LATE ONSET ALZHEIMER'S DISEASE WITHOUT BEHAVIORAL DISTURBANCE (HCC): ICD-10-CM

## 2024-05-08 DIAGNOSIS — E44.1 MILD PROTEIN-CALORIE MALNUTRITION (HCC): ICD-10-CM

## 2024-05-08 DIAGNOSIS — E78.2 MIXED HYPERLIPIDEMIA: ICD-10-CM

## 2024-05-08 DIAGNOSIS — E55.9 VITAMIN D INSUFFICIENCY: ICD-10-CM

## 2024-05-08 LAB
ALBUMIN SERPL BCP-MCNC: 3.9 G/DL (ref 3.5–5)
ALP SERPL-CCNC: 54 U/L (ref 34–104)
ALT SERPL W P-5'-P-CCNC: 9 U/L (ref 7–52)
ANION GAP SERPL CALCULATED.3IONS-SCNC: 10 MMOL/L (ref 4–13)
AST SERPL W P-5'-P-CCNC: 13 U/L (ref 13–39)
BILIRUB SERPL-MCNC: 0.77 MG/DL (ref 0.2–1)
BUN SERPL-MCNC: 34 MG/DL (ref 5–25)
CALCIUM SERPL-MCNC: 9.2 MG/DL (ref 8.4–10.2)
CHLORIDE SERPL-SCNC: 106 MMOL/L (ref 96–108)
CO2 SERPL-SCNC: 27 MMOL/L (ref 21–32)
CREAT SERPL-MCNC: 1.61 MG/DL (ref 0.6–1.3)
GFR SERPL CREATININE-BSD FRML MDRD: 37 ML/MIN/1.73SQ M
GLUCOSE P FAST SERPL-MCNC: 118 MG/DL (ref 65–99)
POTASSIUM SERPL-SCNC: 4.8 MMOL/L (ref 3.5–5.3)
PROT SERPL-MCNC: 6.6 G/DL (ref 6.4–8.4)
SODIUM SERPL-SCNC: 143 MMOL/L (ref 135–147)

## 2024-05-08 PROCEDURE — 99214 OFFICE O/P EST MOD 30 MIN: CPT | Performed by: FAMILY MEDICINE

## 2024-05-08 PROCEDURE — G0439 PPPS, SUBSEQ VISIT: HCPCS | Performed by: FAMILY MEDICINE

## 2024-05-08 RX ORDER — MIDODRINE HYDROCHLORIDE 2.5 MG/1
2.5 TABLET ORAL 2 TIMES DAILY
COMMUNITY
Start: 2024-04-29

## 2024-05-08 NOTE — PROGRESS NOTES
" Assessment and Plan:     Problem List Items Addressed This Visit    None       Preventive health issues were discussed with patient, and age appropriate screening tests were ordered as noted in patient's After Visit Summary.  Personalized health advice and appropriate referrals for health education or preventive services given if needed, as noted in patient's After Visit Summary.     History of Present Illness:     Patient presents for a Medicare Wellness Visit    Cough       Patient Care Team:  BRYAN Middleton DO as PCP - General (Family Medicine)     Review of Systems:     Review of Systems   Respiratory:  Positive for cough.         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)    Glaucoma of both eyes    Platelets decreased (HCC)    Vitamin D deficiency    Squamous cell carcinoma of face    Spinal stenosis of lumbar region    Dementia (HCC)    Coronary artery disease    At risk for injury related to fall    Ambulatory dysfunction    Hypertensive 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 (hard of " hearing)     will not wear hearing aids    Hyperlipidemia     Hypertension     Joint pain     hands    Risk for falls     Shortness of breath     Voice disorder      Past Surgical History:   Procedure Laterality Date    BASAL CELL CARCINOMA EXCISION  10/11/2019    Removed from back and face    CATARACT EXTRACTION, BILATERAL Bilateral 2014    COLONOSCOPY      CORNEAL TRANSPLANT Left 2015    CORONARY ANGIOPLASTY WITH STENT PLACEMENT  2007    HERNIA REPAIR Right 2022    inguinal    NV LARYNGOSCOPY W/BIOPSY MICROSCOPE/TELESCOPE Right 2022    Procedure: MICORLARYNGOSCOPY & BIOPSY ;  Surgeon: Ru Gifford MD;  Location: [facility];  Service: ENT    SKIN CANCER EXCISION Left 2017    skin cancer removed from left side of face    TONSILLECTOMY        Family History:     Family History   Problem Relation Age of Onset    No Known Problems Mother     No Known Problems Father       Social History:     Social History     Socioeconomic History    Marital status: /Civil Union     Spouse name: Renée    Number of children: None    Years of education: None    Highest education level: None   Occupational History    None   Tobacco Use    Smoking status: Former     Current packs/day: 1.00     Average packs/day: 1 pack/day for 32.0 years (32.0 ttl pk-yrs)     Types: Cigarettes    Smokeless tobacco: Never    Tobacco comments:     smoked age [age], about 1 ppd    Vaping Use    Vaping status: Never Used   Substance and Sexual Activity    Alcohol use: Yes     Comment: monthly    Drug use: No    Sexual activity: None     Comment: defer   Other Topics Concern    None   Social History Narrative    · Most recent tobacco use screenin-      · 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      · If Yes, What branch of service:   [employer]      · Were you activated, into active duty, as a member of the [employer] or as a Reservist:Yes, 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      · Diet:   Regular      · General stress level:   Low      · Guns present in home:   No      · Seat belts used routinely:   Yes      · Smoke alarm in home:   Yes      · Advance directive:   Yes      · Live alone or with others:   with others      · Are there stairs in your home:   Yes      · Pets:   No      Social Determinants of Health     Financial Resource Strain: Low Risk  (1/31/2024)    Received from [facility]    Overall Financial Resource Strain (CARDIA)     Difficulty of Paying Living Expenses: Not hard at all   Food Insecurity: No Food Insecurity (1/31/2024)    Received from [facility]    Hunger Vital Sign     Worried About Running Out of Food in the Last Year: Never true     Ran Out of Food in the Last Year: Never true   Transportation Needs: No Transportation Needs (1/31/2024)    Received from [facility]    PRAPARE - Transportation     Lack of Transportation (Medical): No     Lack of Transportation (Non-Medical): No   Physical Activity: Not on file   Stress: Not on file   Social Connections: Not on file   Intimate Partner Violence: Not At Risk (1/31/2024)    Received from [facility]    Humiliation, Afraid, Rape, and Kick questionnaire     Fear of Current or Ex-Partner: No     Emotionally Abused: No     Physically Abused: No     Sexually Abused: No   Housing Stability: Low Risk  (1/31/2024)    Received from [facility]    Housing Stability Vital Sign     Unable to Pay for Housing in the Last Year: No     Number of Places Lived in the Last Year: 1     Unstable Housing in the Last Year: No      Medications and Allergies:     Current Outpatient Medications   Medication Sig Dispense Refill    albuterol (PROVENTIL HFA,VENTOLIN HFA) 90 mcg/act inhaler Inhale 2 puffs every 4 (four) hours as needed for wheezing      aspirin (ECOTRIN LOW STRENGTH) 81 mg EC tablet Take 81 mg by mouth daily      brimonidine (ALPHAGAN P) 0.15 %  ophthalmic solution Administer 1 drop into the left eye 3 (three) times a day      brimonidine tartrate 0.2 % ophthalmic solution INSTILL 1 DROP INTO LEFT EYE 3 TIMES A DAY      brinzolamide (AZOPT) 1 % ophthalmic suspension SHAKE LIQUID AND INSTILL 1 DROP IN LEFT EYE THREE TIMES DAILY      calcium citrate-vitamin D (CITRACAL+D) 315-200 MG-UNIT per tablet Take 1 tablet by mouth daily      cholecalciferol (VITAMIN D3) 1,000 units tablet Take 1,000 Units by mouth daily      cyanocobalamin (VITAMIN B-12) 1,000 mcg tablet Take by mouth daily      furosemide (LASIX) 20 mg tablet TAKE 1 TABLET BY MOUTH EVERY DAY 30 tablet 6    midodrine (PROAMATINE) 2.5 mg tablet Take 2.5 mg by mouth 2 (two) times a day      prednisoLONE acetate (PRED FORTE) 1 % ophthalmic suspension Administer 1 drop into the left eye 3 (three) times a day      atenolol (TENORMIN) 25 mg tablet Take 25 mg by mouth daily (Patient not taking: Reported on 4/21/2023)      irbesartan (AVAPRO) 150 mg tablet Take 75 mg by mouth daily (Patient not taking: Reported on 8/17/2023)      latanoprost (XALATAN) 0.005 % ophthalmic solution Administer 1 drop to both eyes daily at bedtime (Patient not taking: Reported on 1/3/2024)      simvastatin (ZOCOR) 40 mg tablet TAKE 20MG (ONE-HALF TABLET) BY MOUTH QD5P FOR CHOLESTEROL (Patient not taking: Reported on 4/21/2023)       No current facility-administered medications for this visit.     Allergies   Allergen Reactions    Contrast [Iodinated Contrast Media] Rash    Gadolinium Itching    Iodine - Food Allergy Rash      Immunizations:     Immunization History   Administered Date(s) Administered    COVID-19 MODERNA VACC 0.5 ML IM 02/08/2021, 03/08/2021, 12/03/2021, 12/31/2021    INFLUENZA 11/05/2002, 11/22/2002, 10/11/2003, 10/07/2004, 10/13/2005, 11/14/2006, 10/04/2007, 10/02/2008, 09/11/2009, 09/30/2010, 09/13/2011, 09/07/2012, 09/24/2013, 09/09/2014, 09/06/2015, 09/01/2016, 09/01/2017, 09/20/2018, 10/01/2019, 09/01/2020,  09/01/2021, 10/07/2022    Influenza Split High Dose Preservative Free IM 09/13/2018    Influenza, high dose seasonal 0.7 mL 10/07/2022, 10/15/2023    Pneumococcal 01/01/2000, 11/05/2002    Pneumococcal Conjugate 13-Valent 08/25/2017    Pneumococcal Polysaccharide PPV23 08/24/2021    TD (adult) Preservative Free 05/31/2022    Tdap 08/25/2017    influenza, trivalent, adjuvanted 10/24/2019      Health Maintenance:     There are no preventive care reminders to display for this patient.      Topic Date Due    COVID-19 Vaccine (5 - 2023-24 season) 09/01/2023      Medicare Screening Tests and Risk Assessments:     Alfredo is here for his Subsequent Wellness visit. Last Medicare Wellness visit information reviewed, patient interviewed and updates made to the record today.      Historian  Patient cannot answer questions due to cognitive impairment, intelluctual disability, or expressive limitations. Information provided by: caregiver and family.    Health Risk Assessment:   Patient rates overall health as good. Patient feels that their physical health rating is slightly worse. Patient is satisfied with their life. Eyesight was rated as slightly worse. Hearing was rated as slightly worse. Patient feels that their emotional and mental health rating is same. Patients states they are never, rarely angry. Patient states they are sometimes unusually tired/fatigued. Pain experienced in the last 7 days has been none. Patient states that he has experienced no weight loss or gain in last 6 months.     Depression Screening:   PHQ-2 Score: 0      Fall Risk Screening:   In the past year, patient has experienced: history of falling in past year    Number of falls: 1  Injured during fall?: No    Feels unsteady when standing or walking?: No    Worried about falling?: No      Home Safety:  Patient has trouble with stairs inside or outside of their home. Patient has working smoke alarms and has working carbon monoxide detector. Home safety  hazards include: none.     Nutrition:   Current diet is Limited junk food and Other (please comment). Pt spouse reports pt eats small portion sizes -- approx 3 meals daily. Pt has regular protein and supplements w boost     Medications:   Patient is currently taking over-the-counter supplements. OTC medications include: see medication list. Patient is not able to manage medications.     Activities of Daily Living (ADLs)/Instrumental Activities of Daily Living (IADLs):   Walk and transfer into and out of bed and chair?: Yes  Dress and groom yourself?: Yes    Bathe or shower yourself?: Yes    Feed yourself? Yes  Do your laundry/housekeeping?: No  Manage your money, pay your bills and track your expenses?: No  Make your own meals?: No    Do your own shopping?: No    Previous Hospitalizations:   Any hospitalizations or ED visits within the last 12 months?: Yes    How many hospitalizations have you had in the last year?: 1-2    Hospitalization Comments: January 2024 -- cough     Advance Care Planning:   Living will: Yes    Durable POA for healthcare: Yes    Advanced directive: Yes    Advanced directive counseling given: Yes    ACP document given: Yes    Patient declined ACP directive: No    End of Life Decisions reviewed with patient: Yes    Provider agrees with end of life decisions: Yes      Cognitive Screening:   Provider or family/friend/caregiver concerned regarding cognition?: Yes  Mini-Mental Status Exam (MMSE) Score: 0  Interpretation: MMSE Score 0-9: Severe dementia    Cognition Comments: Pt is total care--everything falls on wife.  Thinks he ate, but didn't.  Hides things. Sleeps in his clothes.     PREVENTIVE SCREENINGS      Cardiovascular Screening:    General: Screening Not Indicated, History Lipid Disorder and Risks and Benefits Discussed      Diabetes Screening:     General: Screening Current and Risks and Benefits Discussed      Colorectal Cancer Screening:     General: Screening Not Indicated, Risks and  Benefits Discussed and Patient Declines      Prostate Cancer Screening:    General: Screening Not Indicated and Risks and Benefits Discussed      Osteoporosis Screening:    General: Risks and Benefits Discussed, Patient Declines and Screening Not Indicated      Abdominal Aortic Aneurysm (AAA) Screening:    Risk factors include: tobacco use        Lung Cancer Screening:     General: Screening Not Indicated      Hepatitis C Screening:    General: Risks and Benefits Discussed    Screening, Brief Intervention, and Referral to Treatment (SBIRT)    Screening  Typical number of drinks in a day: 0  Typical number of drinks in a week: 0  Interpretation: Low risk drinking behavior.    Single Item Drug Screening:  How often have you used an illegal drug (including marijuana) or a prescription medication for non-medical reasons in the past year? never    Single Item Drug Screen Score: 0  Interpretation: Negative screen for possible drug use disorder    Brief Intervention  Alcohol & drug use screenings were reviewed. No concerns regarding substance use disorder identified. Healthy alcohol use/limits discussed.     Other Counseling Topics:   Car/seat belt/driving safety, skin self-exam, sunscreen and calcium and vitamin D intake and regular weightbearing exercise.     No results found.     Physical Exam:     There were no vitals taken for this visit.    Physical Exam     BRYAN Middleton, DO

## 2024-05-08 NOTE — PROGRESS NOTES
"Name: Alfredo Dahl      : 1933      MRN: [id number]  Encounter Provider: BRYAN Middleton DO  Encounter Date: 2024   Encounter department: [facility]    Assessment & Plan     1. Essential hypertension  Comments:  But currently taking midodrine 2.5 twice daily by Dr. Mann for low blood pressure.  Avapro discontinued    2. Encounter for long-term (current) use of medications  Comments:  All medications evaluated with for safety efficacy and tolerance.  He is on 4 eyedrops and only Lasix and midodrine Rx at this time    3. Stage 3b chronic kidney disease (HCC)  Comments:  GFR 37 creatinine 1.61 potassium 4.8    4. Medication management  Comments:  all meds eval and reviewed for safety, efficacy, and tyolernace    5. Vitamin D insufficiency  Comments:  Recent vitamin D level 56    6. Mixed hyperlipidemia    7. Late onset Alzheimer'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 (HCC)  Comments:  Mentally wife states is \"really bad\".   He hides things and Renée can't    8. Mild protein-calorie malnutrition (HCC)  Comments:  Taking Ensure supplements 1 or 2 daily with good response protein levels albumin etc. normal    9. Ambulatory dysfunction  Comments:  walks with roller walker and cane    10. Medicare annual wellness visit, subsequent           Subjective      89-year-old with history of 3 weeks of coughing, occasionally productive and \"mucus\", mostly clear.  No fever or chills.  No hemoptysis. Cough is not terrible and sleeps ok.    All medications reviewed----basically on 2 Lasix and midodrine.    Patient is essentially total care patient at home and his wife is very dedicated and takes care of him and all of his needs.  As mentioned he does not want to get undressed to sleep at night, does not want to take a shower, gets confused on whether he ate or not etc. etc. this weighs very heavy on his wife's mind.    Living will discussed and is on file    All laboratory results reviewed from " May 1 and much appreciated--surprisingly good      Review of Systems   Constitutional:  Negative for activity change, appetite change, chills, diaphoresis, fatigue, fever and unexpected weight change.   HENT:  Negative for congestion, dental problem, drooling, ear discharge, ear pain, facial swelling, mouth sores, nosebleeds, postnasal drip, rhinorrhea, trouble swallowing and voice change.    Eyes:  Negative for photophobia, pain, discharge, redness, itching and visual disturbance.        Sees [facility]- new  there, and Dr. Boyd  Pressure up in [facility] and they will erika about q 6 mo and Dr. Boyd once annually   Respiratory:  Negative for apnea, cough, choking, chest tightness and shortness of breath.             Cardiovascular:  Negative for chest pain and leg swelling.        Denies any and all cardiac symptoms   Gastrointestinal:  Negative for abdominal distention, abdominal pain, constipation, diarrhea and nausea.   Endocrine: Negative for polydipsia, polyphagia and polyuria.   Genitourinary:  Negative for decreased urine volume, difficulty urinating, dysuria, enuresis and hematuria.   Musculoskeletal:  Positive for gait problem. Negative for arthralgias, back pain, joint swelling and neck pain.            Skin:  Negative for color change, pallor, rash and wound.   Allergic/Immunologic: Negative for immunocompromised state.   Neurological:  Negative for dizziness, seizures, syncope, facial asymmetry, speech difficulty, light-headedness and headaches.   Hematological:  Negative for adenopathy.   Psychiatric/Behavioral:  Positive for confusion (sun-downing more. ) and hallucinations (started around summer, 2023--refuses Seraquel ). Negative for agitation, behavioral problems and decreased concentration. The patient is not nervous/anxious.        Current Outpatient Medications on File Prior to Visit   Medication Sig    albuterol (PROVENTIL HFA,VENTOLIN HFA) 90 mcg/act inhaler Inhale 2 puffs every 4 (four)  "hours as needed for wheezing    aspirin (ECOTRIN LOW STRENGTH) 81 mg EC tablet Take 81 mg by mouth daily    brimonidine (ALPHAGAN P) 0.15 % ophthalmic solution Administer 1 drop into the left eye 3 (three) times a day    brimonidine tartrate 0.2 % ophthalmic solution INSTILL 1 DROP INTO LEFT EYE 3 TIMES A DAY    brinzolamide (AZOPT) 1 % ophthalmic suspension SHAKE LIQUID AND INSTILL 1 DROP IN LEFT EYE THREE TIMES DAILY    calcium citrate-vitamin D (CITRACAL+D) 315-200 MG-UNIT per tablet Take 1 tablet by mouth daily    cholecalciferol (VITAMIN D3) 1,000 units tablet Take 1,000 Units by mouth daily    cyanocobalamin (VITAMIN B-12) 1,000 mcg tablet Take by mouth daily    furosemide (LASIX) 20 mg tablet TAKE 1 TABLET BY MOUTH EVERY DAY    midodrine (PROAMATINE) 2.5 mg tablet Take 2.5 mg by mouth 2 (two) times a day    prednisoLONE acetate (PRED FORTE) 1 % ophthalmic suspension Administer 1 drop into the left eye 3 (three) times a day    atenolol (TENORMIN) 25 mg tablet Take 25 mg by mouth daily (Patient not taking: Reported on 4/21/2023)    [DISCONTINUED] irbesartan (AVAPRO) 150 mg tablet Take 75 mg by mouth daily (Patient not taking: Reported on 8/17/2023)    [DISCONTINUED] latanoprost (XALATAN) 0.005 % ophthalmic solution Administer 1 drop to both eyes daily at bedtime (Patient not taking: Reported on 1/3/2024)    [DISCONTINUED] simvastatin (ZOCOR) 40 mg tablet TAKE 20MG (ONE-HALF TABLET) BY MOUTH QD5P FOR CHOLESTEROL (Patient not taking: Reported on 4/21/2023)       Objective     BP 90/58 (BP Location: Left arm, Patient Position: Sitting, Cuff Size: Standard)   Pulse 105   Temp 98.1 °F (36.7 °C) (Temporal)   Ht 6' 6\" (1.981 m)   Wt 76.3 kg (168 lb 3.2 oz)   SpO2 99%   BMI 19.44 kg/m²     Physical Exam  Vitals and nursing note reviewed.   Constitutional:       Appearance: Normal appearance. He is well-developed and normal weight. He is not diaphoretic.   HENT:      Head: Normocephalic and atraumatic.      Nose: " "Nose normal.   Eyes:      Pupils: Pupils are equal, round, and reactive to light.   Neck:      Trachea: No tracheal deviation.   Cardiovascular:      Rate and Rhythm: Normal rate and regular rhythm.      Heart sounds: Normal heart sounds.      Comments: Follow with Dr. Palacios, cardiologist.  Had carotid tested in Apr 2024 by Dr. Palacios  Pulmonary:      Effort: Pulmonary effort is normal.      Breath sounds: Normal breath sounds. No wheezing.   Abdominal:      General: Bowel sounds are normal.      Palpations: Abdomen is soft.   Musculoskeletal:         General: Normal range of motion.      Cervical back: Normal range of motion and neck supple.   Lymphadenopathy:      Cervical: No cervical adenopathy.   Skin:     General: Skin is warm and dry.      Findings: Bruising present.   Neurological:      General: No focal deficit present.      Mental Status: He is alert and oriented to person, place, and time.   Psychiatric:      Comments: Dementia persists  Getting worse:  Can't recall if ate or not, doesn't know day or night, and is \"fixated on garage doors\".  Wife, Renée, runs the entire show now.   Now, summer 2023, sundowning.       BRYAN Middleton, DO    "

## 2024-05-08 NOTE — PATIENT INSTRUCTIONS
Better healthcare is in your genes. Learn more about a community health research program at [employer]     Who is eligible to join?  You are eligible to participate if you are 18 years or older at the time of enrollment. You are not eligible to participate if you are a recipient of a donor bone marrow or a stem cell transplant.     Is there a cost to participate?  There is no cost to participate and health insurance is not required.    What can I learn about in this study?  You can learn about inherited risks for:                                              Common cancers: hereditary breast and ovarian cancer, and                                colorectal cancer related to Trejo syndrome                              Heart disease: hereditary high cholesterol (also known as                                familial hypercholesterolemia)                             * You also have the opportunity to learn about your ancestry and                                other traits like, caffeine sensitivity, sleep patterns and more    How can I sign up? Go to [email or web address] or scan the QR Code to sign up.

## 2024-05-18 ENCOUNTER — APPOINTMENT (EMERGENCY)
Dept: CT IMAGING | Facility: HOSPITAL | Age: 89
End: 2024-05-18
Payer: MEDICARE

## 2024-05-18 ENCOUNTER — HOSPITAL ENCOUNTER (EMERGENCY)
Facility: HOSPITAL | Age: 89
Discharge: HOME | End: 2024-05-18
Attending: EMERGENCY MEDICINE
Payer: MEDICARE

## 2024-05-18 VITALS
DIASTOLIC BLOOD PRESSURE: 69 MMHG | HEART RATE: 65 BPM | OXYGEN SATURATION: 97 % | TEMPERATURE: 98.1 F | RESPIRATION RATE: 18 BRPM | SYSTOLIC BLOOD PRESSURE: 124 MMHG

## 2024-05-18 DIAGNOSIS — T14.8XXA MULTIPLE SKIN TEARS: ICD-10-CM

## 2024-05-18 DIAGNOSIS — S01.21XA LACERATION OF NOSE, INITIAL ENCOUNTER: ICD-10-CM
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
## 2024-05-18 PROCEDURE — 99284 EMERGENCY DEPT VISIT MOD MDM: CPT

## 2024-05-18 PROCEDURE — 70450 CT HEAD/BRAIN W/O DYE: CPT

## 2024-05-18 PROCEDURE — 72125 CT NECK SPINE W/O DYE: CPT

## 2024-05-18 RX ORDER — LIDOCAINE HYDROCHLORIDE AND EPINEPHRINE 10; 10 MG/ML; UG/ML
1 INJECTION, SOLUTION INFILTRATION; PERINEURAL ONCE
Status: COMPLETED | OUTPATIENT
Start: 2024-05-18 | End: 2024-05-18

## 2024-05-18 RX ADMIN — LIDOCAINE HYDROCHLORIDE,EPINEPHRINE BITARTRATE 1 ML: 10; .01 INJECTION, SOLUTION INFILTRATION; PERINEURAL at 14:35

## 2024-05-18 NOTE — ED PROVIDER NOTES
Emergency Department Trauma Note  Alfredo Dahl 89 y.o. male MRN: [id number]  Unit/Bed#: ED-18/ED-18 Encounter: [id number]      Trauma Alert: Trauma Acuity: C  Model of Arrival:   via    Trauma Team: Current Providers  Attending Provider: Manish Daniel MD  Attending Provider: Manish Quintana MD  Registered Nurse: Paige Marquez, RN  Registered Nurse: Jennifer Jones RN  ED Technician: Mary Feldman  Medical Student: Julia Romberger  Consultants:     None      History of Present Illness     Chief Complaint:   Chief Complaint   Patient presents with    Fall     Fall,headstrike around 12pm, +asa     HPI:  Alfredo Dahl is a 89 y.o. male who presents with patient presents with his wife via private vehicle, sent by local urgent care following mechanical fall at [facility].  Per report, patient did not have his walker set up, went to walk and fell forward.  He did strike his nose but did not lose consciousness.  He takes aspirin and no other anticoagulant or antiplatelet medication.  Per patient's wife, patient is currently acting at his baseline, no other concerns besides laceration to his nose and multiple skin tears to his upper and lower extremities..  Mechanism:Details of Incident: trip and fall          HPI  Review of Systems   Skin:  Positive for wound.   All other systems reviewed and are negative.      Historical Information     Immunizations:   Immunization History   Administered Date(s) Administered    COVID-19 MODERNA VACC 0.5 ML IM 02/08/2021, 03/08/2021, 12/03/2021, 12/31/2021    INFLUENZA 11/05/2002, 11/22/2002, 10/11/2003, 10/07/2004, 10/13/2005, 11/14/2006, 10/04/2007, 10/02/2008, 09/11/2009, 09/30/2010, 09/13/2011, 09/07/2012, 09/24/2013, 09/09/2014, 09/06/2015, 09/01/2016, 09/01/2017, 09/20/2018, 10/01/2019, 09/01/2020, 09/01/2021, 10/07/2022    Influenza Split High Dose Preservative Free IM 09/13/2018    Influenza, high dose seasonal 0.7 mL 10/07/2022, 10/15/2023    Pneumococcal  "01/01/2000, 11/05/2002    Pneumococcal Conjugate 13-Valent 08/25/2017    Pneumococcal Polysaccharide PPV23 08/24/2021    TD (adult) Preservative Free 05/31/2022    Tdap 08/25/2017    influenza, trivalent, adjuvanted 10/24/2019       Past Medical History:   Diagnosis Date    Ambulates with cane     Cancer (HCC)     skin; basal cell carcinoma     Coronary artery disease     Dementia (HCC)     Difficulty swallowing     per wife will not use \"Thick it powder\"    Fatigue     Heart disease     History of pneumonia     History of recent fall 05/31/2022    [place] (hard of hearing)     will not wear hearing aids    Hyperlipidemia     Hypertension     Joint pain     hands    Risk for falls     Shortness of breath     Voice disorder        Family History   Problem Relation Age of Onset    No Known Problems Mother     No Known Problems Father      Past Surgical History:   Procedure Laterality Date    BASAL CELL CARCINOMA EXCISION  10/11/2019    Removed from back and face    CATARACT EXTRACTION, BILATERAL Bilateral 01/01/2014    COLONOSCOPY      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    CORONARY ANGIOPLASTY WITH STENT PLACEMENT  01/01/2007    HERNIA REPAIR Right 02/2022    inguinal    MO LARYNGOSCOPY W/BIOPSY MICROSCOPE/TELESCOPE Right 7/29/2022    Procedure: MICORLARYNGOSCOPY & BIOPSY ;  Surgeon: Ru Gifford MD;  Location: [facility];  Service: ENT    SKIN CANCER EXCISION Left 04/28/2017    skin cancer removed from left side of face    TONSILLECTOMY       Social History     Tobacco Use    Smoking status: Former     Current packs/day: 1.00     Average packs/day: 1 pack/day for 32.0 years (32.0 ttl pk-yrs)     Types: Cigarettes    Smokeless tobacco: Never    Tobacco comments:     smoked age [age], about 1 ppd    Vaping Use    Vaping status: Never Used   Substance Use Topics    Alcohol use: Yes     Comment: monthly    Drug use: No     E-Cigarette/Vaping    E-Cigarette Use Never User      E-Cigarette/Vaping Substances    Nicotine " No     THC No     CBD No     Flavoring No     Other No     Unknown No        Family History: non-contributory    Meds/Allergies   Prior to Admission Medications   Prescriptions Last Dose Informant Patient Reported? Taking?   albuterol (PROVENTIL HFA,VENTOLIN HFA) 90 mcg/act inhaler  Spouse/Significant Other Yes No   Sig: Inhale 2 puffs every 4 (four) hours as needed for wheezing   aspirin (ECOTRIN LOW STRENGTH) 81 mg EC tablet  Spouse/Significant Other Yes No   Sig: Take 81 mg by mouth daily   atenolol (TENORMIN) 25 mg tablet  Spouse/Significant Other Yes No   Sig: Take 25 mg by mouth daily   Patient not taking: Reported on 4/21/2023   brimonidine (ALPHAGAN P) 0.15 % ophthalmic solution  Spouse/Significant Other Yes No   Sig: Administer 1 drop into the left eye 3 (three) times a day   brimonidine tartrate 0.2 % ophthalmic solution  Spouse/Significant Other Yes No   Sig: INSTILL 1 DROP INTO LEFT EYE 3 TIMES A DAY   brinzolamide (AZOPT) 1 % ophthalmic suspension  Spouse/Significant Other Yes No   Sig: SHAKE LIQUID AND INSTILL 1 DROP IN LEFT EYE THREE TIMES DAILY   calcium citrate-vitamin D (CITRACAL+D) 315-200 MG-UNIT per tablet  Spouse/Significant Other Yes No   Sig: Take 1 tablet by mouth daily   cholecalciferol (VITAMIN D3) 1,000 units tablet  Spouse/Significant Other Yes No   Sig: Take 1,000 Units by mouth daily   cyanocobalamin (VITAMIN B-12) 1,000 mcg tablet  Spouse/Significant Other Yes No   Sig: Take by mouth daily   furosemide (LASIX) 20 mg tablet  Spouse/Significant Other No No   Sig: TAKE 1 TABLET BY MOUTH EVERY DAY   midodrine (PROAMATINE) 2.5 mg tablet  Spouse/Significant Other Yes No   Sig: Take 2.5 mg by mouth 2 (two) times a day   prednisoLONE acetate (PRED FORTE) 1 % ophthalmic suspension  Spouse/Significant Other Yes No   Sig: Administer 1 drop into the left eye 3 (three) times a day      Facility-Administered Medications: None       Allergies   Allergen Reactions    Contrast [Iodinated Contrast  Media] Rash    Gadolinium Itching    Iodine - Food Allergy Rash       PHYSICAL EXAM    PE limited by: none    Objective   Vitals:   First set: Temperature: 98.1 °F (36.7 °C) (05/18/24 1333)  Pulse: 96 (05/18/24 1335)  Respirations: 16 (05/18/24 1334)  Blood Pressure: 131/85 (05/18/24 1334)  SpO2: 97 % (05/18/24 1335)    Primary Survey:   (A) Airway: intact  (B) Breathing: equal bilateral  (C) Circulation: Pulses:   normal  (D) Disabliity:  GCS Total:  14  (E) Expose:  Completed    Secondary Survey: (Click on Physical Exam tab above)  Physical Exam  Vitals and nursing note reviewed.   Constitutional:       Appearance: Normal appearance.   HENT:      Head:      Comments: 5 mm laceration to nose.  No focal tenderness.  No deformity.     Right Ear: External ear normal.      Left Ear: External ear normal.   Eyes:      Extraocular Movements: Extraocular movements intact.      Pupils: Pupils are equal, round, and reactive to light.   Cardiovascular:      Rate and Rhythm: Normal rate.   Pulmonary:      Effort: Pulmonary effort is normal.      Breath sounds: Normal breath sounds.   Abdominal:      General: There is no distension.      Tenderness: There is no abdominal tenderness.   Musculoskeletal:         General: Normal range of motion.      Comments: Range of motion normal.  No focal bony tenderness.  Scattered skin tears to lower extremities   Neurological:      Mental Status: He is alert.   Psychiatric:      Comments: Baseline dementia, mental status within normal limits for patient per wife.         Cervical spine cleared by clinical criteria? No (imaging required)      Invasive Devices       None                   Lab Results:   Results Reviewed       None                   Imaging Studies:   Direct to CT: Yes  TRAUMA - CT head wo contrast   Final Result by E. Alec Schoenberger, MD (05/18 3469)      No acute intracranial abnormality. Chronic microangiopathy                  Workstation performed: [id number]          TRAUMA - CT spine cervical wo contrast   Final Result by E. Alec Schoenberger, MD (05/18 6746)      No cervical spine fracture or traumatic malalignment.                  Workstation performed: [id number]               Procedures  Universal Protocol:  Consent: Verbal consent obtained.  Laceration repair    Date/Time: 5/18/2024 3:03 PM    Performed by: Manish Quintana MD  Authorized by: Manish Quintana MD  Laceration length: 0.5 cm  Tendon involvement: none  Nerve involvement: none  Anesthesia: local infiltration    Anesthesia:  Local Anesthetic: lidocaine 1% with epinephrine  Anesthetic total: 1 mL    Sedation:  Patient sedated: no      Wound Dehiscence:  Superficial Wound Dehiscence: simple closure      Procedure Details:  Irrigation solution: saline  Irrigation method: jet lavage  Amount of cleaning: standard  Skin closure: 5-0 nylon  Number of sutures: 2  Approximation: close  Approximation difficulty: simple  Patient tolerance: patient tolerated the procedure well with no immediate complications      CriticalCare Time    Date/Time: 5/18/2024 3:05 PM    Performed by: Manish Quintana MD  Authorized by: Manish Quintana MD    Critical care provider statement:     Critical care time (minutes):  45    Critical care time was exclusive of:  Separately billable procedures and treating other patients and teaching time    Critical care was necessary to treat or prevent imminent or life-threatening deterioration of the following conditions:  Trauma    Critical care was time spent personally by me on the following activities:  Examination of patient, ordering and review of laboratory studies, ordering and review of radiographic studies and re-evaluation of patient's condition    I assumed direction of critical care for this patient from another provider in my specialty: no    Comments:      Trauma C requiring my immediate bedside presents for evaluation, advanced imaging, reassessment.           ED  Course           Medical Decision Making  Mechanical fall, trauma C due to fall, head strike on aspirin.  Currently GCS 15.  CT head and cervical spine with no acute fracture.  Otherwise moving all extremities, able to stand and bear weight.  No other complaints and otherwise normal examination besides skin tears to bilateral lower extremities and left upper extremity.  Skin tears cleaned, nonstick dressing applied.  Laceration to bridge of nose closed per procedure note.  Patient maintained at his baseline, accompanied by his wife and stable at time of discharge home from the emergency department.    Amount and/or Complexity of Data Reviewed  Radiology: ordered.    Risk  Prescription drug management.                Disposition  Priority One Transfer: No  Final diagnoses:   Fall, initial encounter   Laceration of nose, initial encounter   Multiple skin tears     Time reflects when diagnosis was documented in both MDM as applicable and the Disposition within this note       Time User Action Codes Description Comment    5/18/2024  2:59 PM Manish Quintana [W19.XXXA] Fall, initial encounter     5/18/2024  2:59 PM Manish Quintana [S01.21XA] Complex laceration of nose, initial encounter     5/18/2024  2:59 PM Manish Quintana [S01.21XA] Complex laceration of nose, initial encounter     5/18/2024  2:59 PM Manish Quintana Add [S01.21XA] Laceration of nose, initial encounter     5/18/2024  2:59 PM Manish Quintana [T14.8XXA] Multiple skin tears           ED Disposition       ED Disposition   Discharge    Condition   Stable    Date/Time   Sat May 18, 2024  2:59 PM    Comment   Alfredo Dahl discharge to home/self care.                   Follow-up Information       Follow up With Specialties Details Why Contact Info Additional Information    BRYAN Middleton, DO Family Medicine Go in 1 week For suture removal (2 to nose) [address]  [address]  [facility] [address] [phone]       Formerly [facility]  [facility] Emergency Medicine Go to  As needed [address] [address]  [phone] Formerly [facility] Emergency Department, [address] [place], [place], [address]          Discharge Medication List as of 5/18/2024  3:00 PM        CONTINUE these medications which have NOT CHANGED    Details   albuterol (PROVENTIL HFA,VENTOLIN HFA) 90 mcg/act inhaler Inhale 2 puffs every 4 (four) hours as needed for wheezing, Historical Med      aspirin (ECOTRIN LOW STRENGTH) 81 mg EC tablet Take 81 mg by mouth daily, Historical Med      atenolol (TENORMIN) 25 mg tablet Take 25 mg by mouth daily, Historical Med      brimonidine (ALPHAGAN P) 0.15 % ophthalmic solution Administer 1 drop into the left eye 3 (three) times a day, Historical Med      brimonidine tartrate 0.2 % ophthalmic solution INSTILL 1 DROP INTO LEFT EYE 3 TIMES A DAY, Historical Med      brinzolamide (AZOPT) 1 % ophthalmic suspension SHAKE LIQUID AND INSTILL 1 DROP IN LEFT EYE THREE TIMES DAILY, Historical Med      calcium citrate-vitamin D (CITRACAL+D) 315-200 MG-UNIT per tablet Take 1 tablet by mouth daily, Historical Med      cholecalciferol (VITAMIN D3) 1,000 units tablet Take 1,000 Units by mouth daily, Historical Med      cyanocobalamin (VITAMIN B-12) 1,000 mcg tablet Take by mouth daily, Historical Med      furosemide (LASIX) 20 mg tablet TAKE 1 TABLET BY MOUTH EVERY DAY, Normal      midodrine (PROAMATINE) 2.5 mg tablet Take 2.5 mg by mouth 2 (two) times a day, Starting Mon 4/29/2024, Historical Med      prednisoLONE acetate (PRED FORTE) 1 % ophthalmic suspension Administer 1 drop into the left eye 3 (three) times a day, Historical Med           No discharge procedures on file.    PDMP Review       None            ED Provider  Electronically Signed by           Manish Quintana MD  05/18/24 4845

## 2024-05-21 ENCOUNTER — VBI (OUTPATIENT)
Dept: FAMILY MEDICINE CLINIC | Facility: CLINIC | Age: 89
End: 2024-05-21

## 2024-05-21 NOTE — TELEPHONE ENCOUNTER
05/21/24 11:26 AM    Patient contacted post ED visit, first outreach attempt made. Message was left for patient to return a call to the VBI Department at [facility]: Phone [phone].    Thank you.  Lala Retana  [facility]

## 2024-05-24 NOTE — TELEPHONE ENCOUNTER
05/24/24 11:33 AM    Patient contacted post ED visit, second outreach attempt made. Message was left for patient to return a call to the VBI Department at [facility]: Phone [phone].    Thank you.  Lala Retana  [facility]

## 2024-05-29 NOTE — TELEPHONE ENCOUNTER
05/29/24 10:41 AM    Patient contacted post ED visit, VBI department spoke with patient/caregiver and outreach was successful.    Thank you.  Lala Retana  [facility]

## 2024-07-23 ENCOUNTER — APPOINTMENT (OUTPATIENT)
Dept: LAB | Facility: AMBULARY SURGERY CENTER | Age: 89
End: 2024-07-23
Payer: MEDICARE

## 2024-07-23 DIAGNOSIS — Z79.899 ENCOUNTER FOR LONG-TERM (CURRENT) USE OF OTHER MEDICATIONS: ICD-10-CM

## 2024-07-23 DIAGNOSIS — I10 ESSENTIAL HYPERTENSION, MALIGNANT: ICD-10-CM

## 2024-07-23 DIAGNOSIS — E78.00 PURE HYPERCHOLESTEROLEMIA: ICD-10-CM

## 2024-07-23 DIAGNOSIS — I25.10 DISEASE OF CARDIOVASCULAR SYSTEM: ICD-10-CM

## 2024-07-23 LAB
ANION GAP SERPL CALCULATED.3IONS-SCNC: 9 MMOL/L (ref 4–13)
BUN SERPL-MCNC: 36 MG/DL (ref 5–25)
CALCIUM SERPL-MCNC: 8.7 MG/DL (ref 8.4–10.2)
CHLORIDE SERPL-SCNC: 105 MMOL/L (ref 96–108)
CO2 SERPL-SCNC: 28 MMOL/L (ref 21–32)
CREAT SERPL-MCNC: 1.5 MG/DL (ref 0.6–1.3)
GFR SERPL CREATININE-BSD FRML MDRD: 40 ML/MIN/1.73SQ M
GLUCOSE SERPL-MCNC: 108 MG/DL (ref 65–140)
POTASSIUM SERPL-SCNC: 4.4 MMOL/L (ref 3.5–5.3)
SODIUM SERPL-SCNC: 142 MMOL/L (ref 135–147)

## 2024-07-23 PROCEDURE — 36415 COLL VENOUS BLD VENIPUNCTURE: CPT

## 2024-07-23 PROCEDURE — 80048 BASIC METABOLIC PNL TOTAL CA: CPT

## 2024-08-12 DIAGNOSIS — I10 ESSENTIAL HYPERTENSION: ICD-10-CM

## 2024-08-13 RX ORDER — FUROSEMIDE 20 MG
TABLET ORAL
Qty: 100 TABLET | Refills: 1 | Status: SHIPPED | OUTPATIENT
Start: 2024-08-13

## 2024-10-15 ENCOUNTER — APPOINTMENT (OUTPATIENT)
Dept: LAB | Facility: AMBULARY SURGERY CENTER | Age: 89
End: 2024-10-15
Payer: MEDICARE

## 2024-10-15 DIAGNOSIS — Z79.899 NEED FOR PROPHYLACTIC CHEMOTHERAPY: ICD-10-CM

## 2024-10-15 DIAGNOSIS — E78.1 PURE HYPERGLYCERIDEMIA: ICD-10-CM

## 2024-10-15 DIAGNOSIS — I10 ESSENTIAL HYPERTENSION, MALIGNANT: ICD-10-CM

## 2024-10-15 DIAGNOSIS — E78.00 PURE HYPERCHOLESTEROLEMIA: ICD-10-CM

## 2024-10-15 LAB
ANION GAP SERPL CALCULATED.3IONS-SCNC: 8 MMOL/L (ref 4–13)
BUN SERPL-MCNC: 33 MG/DL (ref 5–25)
CALCIUM SERPL-MCNC: 8.9 MG/DL (ref 8.4–10.2)
CHLORIDE SERPL-SCNC: 104 MMOL/L (ref 96–108)
CO2 SERPL-SCNC: 30 MMOL/L (ref 21–32)
CREAT SERPL-MCNC: 1.47 MG/DL (ref 0.6–1.3)
GFR SERPL CREATININE-BSD FRML MDRD: 41 ML/MIN/1.73SQ M
GLUCOSE P FAST SERPL-MCNC: 204 MG/DL (ref 65–99)
POTASSIUM SERPL-SCNC: 3.9 MMOL/L (ref 3.5–5.3)
SODIUM SERPL-SCNC: 142 MMOL/L (ref 135–147)

## 2024-10-15 PROCEDURE — 80048 BASIC METABOLIC PNL TOTAL CA: CPT

## 2024-10-15 PROCEDURE — 36415 COLL VENOUS BLD VENIPUNCTURE: CPT

## 2024-11-12 ENCOUNTER — OFFICE VISIT (OUTPATIENT)
Dept: FAMILY MEDICINE CLINIC | Facility: CLINIC | Age: 89
End: 2024-11-12
Payer: MEDICARE

## 2024-11-12 ENCOUNTER — APPOINTMENT (OUTPATIENT)
Dept: LAB | Facility: AMBULARY SURGERY CENTER | Age: 89
End: 2024-11-12
Payer: MEDICARE

## 2024-11-12 VITALS
DIASTOLIC BLOOD PRESSURE: 64 MMHG | WEIGHT: 156 LBS | SYSTOLIC BLOOD PRESSURE: 96 MMHG | TEMPERATURE: 97.7 F | HEIGHT: 78 IN | BODY MASS INDEX: 18.05 KG/M2

## 2024-11-12 DIAGNOSIS — I10 ESSENTIAL HYPERTENSION: ICD-10-CM

## 2024-11-12 DIAGNOSIS — G30.1 LATE ONSET ALZHEIMER'S DISEASE WITHOUT BEHAVIORAL DISTURBANCE (HCC): Primary | ICD-10-CM

## 2024-11-12 DIAGNOSIS — I13.0 HYPERTENSIVE HEART AND RENAL DISEASE WITH CONGESTIVE HEART FAILURE (HCC): ICD-10-CM

## 2024-11-12 DIAGNOSIS — E55.9 VITAMIN D INSUFFICIENCY: ICD-10-CM

## 2024-11-12 DIAGNOSIS — I70.248 ATHEROSCLEROSIS OF NATIVE ARTERY OF LEFT LOWER EXTREMITY WITH ULCERATION OF OTHER PART OF LOWER LEG (HCC): ICD-10-CM

## 2024-11-12 DIAGNOSIS — I70.211 ATHEROSCLEROSIS OF NATIVE ARTERY OF RIGHT LOWER EXTREMITY WITH INTERMITTENT CLAUDICATION (HCC): ICD-10-CM

## 2024-11-12 DIAGNOSIS — J43.9 PULMONARY EMPHYSEMA, UNSPECIFIED EMPHYSEMA TYPE (HCC): ICD-10-CM

## 2024-11-12 DIAGNOSIS — N18.32 STAGE 3B CHRONIC KIDNEY DISEASE (HCC): ICD-10-CM

## 2024-11-12 DIAGNOSIS — R73.9 ELEVATED BLOOD SUGAR: ICD-10-CM

## 2024-11-12 DIAGNOSIS — R53.83 FATIGUE, UNSPECIFIED TYPE: ICD-10-CM

## 2024-11-12 DIAGNOSIS — E78.2 MIXED HYPERLIPIDEMIA: ICD-10-CM

## 2024-11-12 DIAGNOSIS — E55.9 VITAMIN D DEFICIENCY: ICD-10-CM

## 2024-11-12 DIAGNOSIS — R26.2 AMBULATORY DYSFUNCTION: ICD-10-CM

## 2024-11-12 DIAGNOSIS — R44.1 HALLUCINATION, VISUAL: ICD-10-CM

## 2024-11-12 DIAGNOSIS — G61.81 CHRONIC INFLAMMATORY DEMYELINATING POLYNEUROPATHY (HCC): ICD-10-CM

## 2024-11-12 DIAGNOSIS — R79.9 ABNORMAL BLOOD CHEMISTRY TEST: ICD-10-CM

## 2024-11-12 DIAGNOSIS — E53.8 VITAMIN B12 DEFICIENCY: ICD-10-CM

## 2024-11-12 DIAGNOSIS — F02.80 LATE ONSET ALZHEIMER'S DISEASE WITHOUT BEHAVIORAL DISTURBANCE (HCC): Primary | ICD-10-CM

## 2024-11-12 DIAGNOSIS — M48.061 SPINAL STENOSIS OF LUMBAR REGION WITHOUT NEUROGENIC CLAUDICATION: ICD-10-CM

## 2024-11-12 LAB
25(OH)D3 SERPL-MCNC: 57.4 NG/ML (ref 30–100)
ALBUMIN SERPL BCG-MCNC: 3.9 G/DL (ref 3.5–5)
ALP SERPL-CCNC: 75 U/L (ref 34–104)
ALT SERPL W P-5'-P-CCNC: 10 U/L (ref 7–52)
ANION GAP SERPL CALCULATED.3IONS-SCNC: 8 MMOL/L (ref 4–13)
AST SERPL W P-5'-P-CCNC: 13 U/L (ref 13–39)
BILIRUB SERPL-MCNC: 0.73 MG/DL (ref 0.2–1)
BUN SERPL-MCNC: 39 MG/DL (ref 5–25)
CALCIUM SERPL-MCNC: 9.7 MG/DL (ref 8.4–10.2)
CHLORIDE SERPL-SCNC: 106 MMOL/L (ref 96–108)
CHOLEST SERPL-MCNC: 187 MG/DL
CO2 SERPL-SCNC: 30 MMOL/L (ref 21–32)
CREAT SERPL-MCNC: 1.36 MG/DL (ref 0.6–1.3)
ERYTHROCYTE [DISTWIDTH] IN BLOOD BY AUTOMATED COUNT: 13.2 % (ref 11.6–15.1)
EST. AVERAGE GLUCOSE BLD GHB EST-MCNC: 120 MG/DL
GFR SERPL CREATININE-BSD FRML MDRD: 45 ML/MIN/1.73SQ M
GLUCOSE P FAST SERPL-MCNC: 102 MG/DL (ref 65–99)
HBA1C MFR BLD: 5.8 %
HCT VFR BLD AUTO: 40.9 % (ref 36.5–49.3)
HDLC SERPL-MCNC: 82 MG/DL
HGB BLD-MCNC: 12.5 G/DL (ref 12–17)
LDLC SERPL CALC-MCNC: 94 MG/DL (ref 0–100)
MCH RBC QN AUTO: 29.9 PG (ref 26.8–34.3)
MCHC RBC AUTO-ENTMCNC: 30.6 G/DL (ref 31.4–37.4)
MCV RBC AUTO: 98 FL (ref 82–98)
NONHDLC SERPL-MCNC: 105 MG/DL
PLATELET # BLD AUTO: 169 THOUSANDS/UL (ref 149–390)
PMV BLD AUTO: 12.6 FL (ref 8.9–12.7)
POTASSIUM SERPL-SCNC: 4.7 MMOL/L (ref 3.5–5.3)
PROT SERPL-MCNC: 6.9 G/DL (ref 6.4–8.4)
RBC # BLD AUTO: 4.18 MILLION/UL (ref 3.88–5.62)
SODIUM SERPL-SCNC: 144 MMOL/L (ref 135–147)
T4 FREE SERPL-MCNC: 0.94 NG/DL (ref 0.61–1.12)
TRIGL SERPL-MCNC: 56 MG/DL
TSH SERPL DL<=0.05 MIU/L-ACNC: 1.97 UIU/ML (ref 0.45–4.5)
VIT B12 SERPL-MCNC: 1118 PG/ML (ref 180–914)
WBC # BLD AUTO: 6.44 THOUSAND/UL (ref 4.31–10.16)

## 2024-11-12 PROCEDURE — 80053 COMPREHEN METABOLIC PANEL: CPT

## 2024-11-12 PROCEDURE — 36415 COLL VENOUS BLD VENIPUNCTURE: CPT

## 2024-11-12 PROCEDURE — 84439 ASSAY OF FREE THYROXINE: CPT

## 2024-11-12 PROCEDURE — 99215 OFFICE O/P EST HI 40 MIN: CPT | Performed by: FAMILY MEDICINE

## 2024-11-12 PROCEDURE — 85027 COMPLETE CBC AUTOMATED: CPT

## 2024-11-12 PROCEDURE — 82607 VITAMIN B-12: CPT

## 2024-11-12 PROCEDURE — 80061 LIPID PANEL: CPT

## 2024-11-12 PROCEDURE — 84443 ASSAY THYROID STIM HORMONE: CPT

## 2024-11-12 PROCEDURE — 83036 HEMOGLOBIN GLYCOSYLATED A1C: CPT

## 2024-11-12 PROCEDURE — G2211 COMPLEX E/M VISIT ADD ON: HCPCS | Performed by: FAMILY MEDICINE

## 2024-11-12 PROCEDURE — 82306 VITAMIN D 25 HYDROXY: CPT

## 2024-11-12 RX ORDER — LATANOPROST 50 UG/ML
SOLUTION/ DROPS OPHTHALMIC
COMMUNITY
Start: 2024-10-08

## 2024-11-12 RX ORDER — GATIFLOXACIN 5 MG/ML
1 SOLUTION/ DROPS OPHTHALMIC 4 TIMES DAILY
COMMUNITY
Start: 2024-10-30

## 2024-11-12 NOTE — ASSESSMENT & PLAN NOTE
Lab Results   Component Value Date    EGFR 41 10/15/2024    EGFR 40 07/23/2024    EGFR 37 05/01/2024    CREATININE 1.47 (H) 10/15/2024    CREATININE 1.50 (H) 07/23/2024    CREATININE 1.61 (H) 05/01/2024       Orders:    CBC; Future    Comprehensive metabolic panel; Future

## 2024-11-12 NOTE — ASSESSMENT & PLAN NOTE
Mostly disuse weakness and dementia really--will evaluate for metabolic disorder such as hypothyroidism, anemia, etc.  Orders:    CBC; Future    UA w Reflex to Microscopic w Reflex to Culture    Comprehensive metabolic panel; Future    Lipid panel; Future    TSH, 3rd generation; Future    Vitamin D 25 hydroxy; Future    Hemoglobin A1C; Future    Albumin / creatinine urine ratio    Vitamin B12; Future    T4, free; Future

## 2024-11-12 NOTE — ASSESSMENT & PLAN NOTE
Blood pressure controlled now more on hypotensive side today 96/64 taking midodrine 2.5 mg twice daily  Orders:    CBC; Future    UA w Reflex to Microscopic w Reflex to Culture    Comprehensive metabolic panel; Future    Lipid panel; Future    TSH, 3rd generation; Future    Vitamin D 25 hydroxy; Future    Hemoglobin A1C; Future    Albumin / creatinine urine ratio    Vitamin B12; Future    T4, free; Future

## 2024-11-12 NOTE — PROGRESS NOTES
"Ambulatory Visit  Name: Alfredo Dahl      : 1933      MRN: [id number]  Encounter Provider: BRYAN Middleton DO  Encounter Date: 2024   Encounter department: [facility] [place]    Assessment & Plan  Late onset Alzheimer'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 (HCC)  Initially diagnosed in 2016 by neurologist.  Has had slow decline but has been getting worse of late       Ambulatory dysfunction  Walks with walker and cane but many times refuses to use saying \"I do not need them\" high fall risk       Mixed hyperlipidemia  Was previously treated for hyperlipidemia but that was stopped about 2 years ago  Orders:    Lipid panel; Future    Vitamin D insufficiency    Orders:    Vitamin D 25 hydroxy; Future    Elevated blood sugar    Orders:    Hemoglobin A1C; Future    Vitamin B12 deficiency  Has dementia since 2016 will recheck vitamin B12 level  Orders:    Vitamin B12; Future    Essential hypertension  Blood pressure controlled now more on hypotensive side today 96/64 taking midodrine 2.5 mg twice daily  Orders:    CBC; Future    UA w Reflex to Microscopic w Reflex to Culture    Comprehensive metabolic panel; Future    Lipid panel; Future    TSH, 3rd generation; Future    Vitamin D 25 hydroxy; Future    Hemoglobin A1C; Future    Albumin / creatinine urine ratio    Vitamin B12; Future    T4, free; Future    Stage 3b chronic kidney disease (HCC)  Lab Results   Component Value Date    EGFR 41 10/15/2024    EGFR 40 2024    EGFR 37 2024    CREATININE 1.47 (H) 10/15/2024    CREATININE 1.50 (H) 2024    CREATININE 1.61 (H) 2024       Orders:    CBC; Future    Comprehensive metabolic panel; Future    Abnormal blood chemistry test    Orders:    CBC; Future    UA w Reflex to Microscopic w Reflex to Culture    Comprehensive metabolic panel; Future    Lipid panel; Future    TSH, 3rd generation; Future    Vitamin D 25 hydroxy; Future    Hemoglobin A1C; Future    Albumin / creatinine " urine ratio    Vitamin B12; Future    T4, free; Future    Fatigue, unspecified type  Mostly disuse weakness and dementia really--will evaluate for metabolic disorder such as hypothyroidism, anemia, etc.  Orders:    CBC; Future    UA w Reflex to Microscopic w Reflex to Culture    Comprehensive metabolic panel; Future    Lipid panel; Future    TSH, 3rd generation; Future    Vitamin D 25 hydroxy; Future    Hemoglobin A1C; Future    Albumin / creatinine urine ratio    Vitamin B12; Future    T4, free; Future    Atherosclerosis of native artery of right lower extremity with intermittent claudication (HCC)  Cardiac wise stable I suspect atrial fibrillation upon clinical exam today but patient is high fall risk and already on aspirin 81 mg       Chronic inflammatory demyelinating polyneuropathy (HCC)  Previously numbness tingling of feet and peripheral neuropathy.  Not so much a complaint any longer does need walker to walk and frequent falls       Hypertensive heart and renal disease with congestive heart failure (HCC)  Wt Readings from Last 3 Encounters:   11/12/24 70.8 kg (156 lb)   05/08/24 76.3 kg (168 lb 3.2 oz)   01/03/24 81.2 kg (179 lb)     Note weight was 179 pounds on January 3, 2024 now 156 pounds--which is loss of 23 pounds in 11 months               Pulmonary emphysema, unspecified emphysema type (HCC)  Quit smoking at age 50 which was 40 years ago       Vitamin D deficiency         Spinal stenosis of lumbar region without neurogenic claudication         Hallucination, visual           Depression Screening and Follow-up Plan: Patient was screened for depression during today's encounter. They screened negative with a PHQ-2 score of 0.        History of Present Illness     History of Present Illness  The patient is a 89-year-old male who presents for evaluation of dementia. He is accompanied by his wife.    He has been diagnosed with Alzheimer's dementia since 2016, which has been progressively worsening. He  often refuses to get out of bed in the morning, preferring to stay in bed until noon. After eating, he tends to return to bed. He is resistant to bathing, and his wife assists him with personal hygiene tasks such as shaving and hair washing. He also struggles with dressing himself and experiences confusion, as evidenced by an incident where he believed he needed to go to the hospital despite not being in pain.    He is experiencing bowel incontinence and has been losing weight. His cardiologist, Dr. Palacios, noted a weight loss of 9 pounds during his last visit in October 2024. Despite having a good appetite for breakfast, he has been experiencing falls, although without injury. He is reluctant to use mobility aids such as a walker or cane.    Additionally, he experiences heavy breathing. He is currently on aspirin 80 mg and has received his influenza vaccine.    SOCIAL HISTORY  He quit smoking when he was 50.     Review of Systems   Constitutional:  Negative for chills and fever.   HENT:  Negative for ear pain and sore throat.    Eyes:  Negative for pain and visual disturbance.   Respiratory:  Negative for cough and shortness of breath.    Cardiovascular:  Negative for chest pain and palpitations.   Gastrointestinal:  Negative for abdominal pain and vomiting.   Genitourinary:  Negative for dysuria and hematuria.   Musculoskeletal:  Negative for arthralgias and back pain.   Skin:  Negative for color change and rash.   Neurological:  Negative for seizures and syncope.        Diagnosed with dementia--Alzheimer's--in 2016 by .  There has been very very slow decline to the point now where patient requires more home care.  Will get out of bed in the morning wants to stay in bed till noon eats wants to go back to bed.  Difficult to give shower --gets awarny often, no violence or fighting, but wife (of 40 yrs) having difficult time managing.  She does the shaving and washing of hair, but [facility]  He does not like to take showers or baths, wife \"wife sent down\" with wipes.  Has become now incontinent of bowel has some control over bladder.  [facility]   All other systems reviewed and are negative.    Objective     BP 96/64 (BP Location: Left arm, Patient Position: Sitting, Cuff Size: Standard)   Temp 97.7 °F (36.5 °C) (Temporal)   Ht 6' 6\" (1.981 m)   Wt 70.8 kg (156 lb)   BMI 18.03 kg/m²     Physical Exam  Vital Signs  Weight is 156.  Physical Exam  Vitals and nursing note reviewed. Exam conducted with a chaperone present.   Constitutional:       General: He is not in acute distress.     Appearance: Normal appearance. He is well-developed. He is not ill-appearing, toxic-appearing or diaphoretic.      Comments: Sees Dr. Palacios, cardiology, q 6 mo, and he noted a weight loss of 9 pounds in the last 6 months.  Patient presents fully dressed with walker with wife in attendance.  He does not know the day year or month or season.  He thinks it is summer. He doesn't know name of his wife (of 40 yrs).  Denies pain or any discomfort.  Eats, but \"very little\". The best meal of the day is b'fast. That is a good meal.   Pt falls freq - last was last mo - no injury. BUT, he claims to not need to use the walker or cane.   Lives alone with wife     HENT:      Head: Normocephalic and atraumatic.      Nose: Nose normal.      Mouth/Throat:      Mouth: Mucous membranes are moist.   Eyes:      Extraocular Movements: Extraocular movements intact.      Conjunctiva/sclera: Conjunctivae normal.      Pupils: Pupils are equal, round, and reactive to light.   Cardiovascular:      Rate and Rhythm: Normal rate and regular rhythm.      Pulses: Normal pulses.      Heart sounds: Normal heart sounds. No murmur heard.     No friction rub.   Pulmonary:      Effort: Pulmonary effort is normal. No respiratory distress.      Breath sounds: Normal breath sounds. No stridor. No wheezing or rhonchi.   Abdominal: "      Palpations: Abdomen is soft.      Tenderness: There is no abdominal tenderness.   Musculoskeletal:         General: No swelling.      Cervical back: Neck supple.   Skin:     General: Skin is warm and dry.      Coloration: Skin is not jaundiced or pale.      Findings: No erythema or rash.   Neurological:      General: No focal deficit present.      Mental Status: He is alert and oriented to person, place, and time. Mental status is at baseline.   Psychiatric:         Mood and Affect: Mood normal.         Behavior: Behavior normal.         Thought Content: Thought content normal.         Judgment: Judgment normal.       Administrative Statements   I have spent a total time of 40 minutes in caring for this patient on the day of the visit/encounter including Diagnostic results, Prognosis, Risks and benefits of tx options, Instructions for management, Patient and family education, Importance of tx compliance, Risk factor reductions, Impressions, Counseling / Coordination of care, Documenting in the medical record, Reviewing / ordering tests, medicine, procedures  , and Obtaining or reviewing history  .

## 2024-11-12 NOTE — ASSESSMENT & PLAN NOTE
Previously numbness tingling of feet and peripheral neuropathy.  Not so much a complaint any longer does need walker to walk and frequent falls

## 2024-11-12 NOTE — ASSESSMENT & PLAN NOTE
Initially diagnosed in 2016 by neurologist.  Has had slow decline but has been getting worse of late

## 2024-11-12 NOTE — ASSESSMENT & PLAN NOTE
Was previously treated for hyperlipidemia but that was stopped about 2 years ago  Orders:    Lipid panel; Future

## 2024-11-12 NOTE — ASSESSMENT & PLAN NOTE
"Walks with walker and cane but many times refuses to use saying \"I do not need them\" high fall risk       "

## 2024-11-12 NOTE — ASSESSMENT & PLAN NOTE
Cardiac wise stable I suspect atrial fibrillation upon clinical exam today but patient is high fall risk and already on aspirin 81 mg

## 2024-11-12 NOTE — ASSESSMENT & PLAN NOTE
Wt Readings from Last 3 Encounters:   11/12/24 70.8 kg (156 lb)   05/08/24 76.3 kg (168 lb 3.2 oz)   01/03/24 81.2 kg (179 lb)     Note weight was 179 pounds on January 3, 2024 now 156 pounds--which is loss of 23 pounds in 11 months

## 2024-11-13 ENCOUNTER — TELEPHONE (OUTPATIENT)
Age: 89
End: 2024-11-13

## 2024-11-13 NOTE — TELEPHONE ENCOUNTER
Hospice nurse saw patient today.  Asking if office has copy of patient DNR.  (Unable to locate in chart).  Please advise.      Fax [phone]

## 2024-11-20 ENCOUNTER — RESULTS FOLLOW-UP (OUTPATIENT)
Dept: FAMILY MEDICINE CLINIC | Facility: CLINIC | Age: 89
End: 2024-11-20

## 2024-11-20 NOTE — TELEPHONE ENCOUNTER
"----- Message from BRYAN Middleton DO sent at 11/17/2024  5:25 PM EST -----   Please tell wife-Renée that labs look good and thanks for doing them the day before he went 1 hospice. Once on Hospice, can't do labs, or special tests, as it is \"comfor care\" only. But she'll want to know all is ok -- as of now  ----- Message -----  From: Lab, Background User  Sent: 11/12/2024   2:25 PM EST  To: BRYAN Middleton DO  "

## (undated) DEVICE — TUBING SUCTION 5MM X 12 FT

## (undated) DEVICE — 3000CC GUARDIAN II: Brand: GUARDIAN

## (undated) DEVICE — CLAMP TOWEL TUBING DISPOSABLE

## (undated) DEVICE — ANTI-FOG SOLUTION WITH FOAM PAD: Brand: DEVON

## (undated) DEVICE — BETHLEHEM UNIVERSAL OUTPATIENT: Brand: CARDINAL HEALTH

## (undated) DEVICE — GLOVE SRG BIOGEL ECLIPSE 7

## (undated) DEVICE — TELFA NON-ADHERENT ABSORBENT DRESSING: Brand: TELFA